# Patient Record
Sex: FEMALE | Race: WHITE | NOT HISPANIC OR LATINO | URBAN - METROPOLITAN AREA
[De-identification: names, ages, dates, MRNs, and addresses within clinical notes are randomized per-mention and may not be internally consistent; named-entity substitution may affect disease eponyms.]

---

## 2020-07-04 ENCOUNTER — EMERGENCY (EMERGENCY)
Facility: HOSPITAL | Age: 60
LOS: 0 days | Discharge: HOME | End: 2020-07-04
Attending: EMERGENCY MEDICINE
Payer: MEDICARE

## 2020-07-04 VITALS
HEART RATE: 112 BPM | TEMPERATURE: 99 F | WEIGHT: 190.04 LBS | DIASTOLIC BLOOD PRESSURE: 72 MMHG | RESPIRATION RATE: 16 BRPM | HEIGHT: 72 IN | SYSTOLIC BLOOD PRESSURE: 134 MMHG

## 2020-07-04 VITALS
DIASTOLIC BLOOD PRESSURE: 62 MMHG | HEART RATE: 85 BPM | RESPIRATION RATE: 17 BRPM | SYSTOLIC BLOOD PRESSURE: 119 MMHG | OXYGEN SATURATION: 99 %

## 2020-07-04 DIAGNOSIS — E78.5 HYPERLIPIDEMIA, UNSPECIFIED: ICD-10-CM

## 2020-07-04 DIAGNOSIS — F20.9 SCHIZOPHRENIA, UNSPECIFIED: ICD-10-CM

## 2020-07-04 DIAGNOSIS — F32.0 MAJOR DEPRESSIVE DISORDER, SINGLE EPISODE, MILD: ICD-10-CM

## 2020-07-04 DIAGNOSIS — R42 DIZZINESS AND GIDDINESS: ICD-10-CM

## 2020-07-04 LAB
ALBUMIN SERPL ELPH-MCNC: 3.4 G/DL — SIGNIFICANT CHANGE UP (ref 3.3–5)
ALP SERPL-CCNC: 126 U/L — HIGH (ref 40–120)
ALT FLD-CCNC: 11 U/L — LOW (ref 12–78)
ANION GAP SERPL CALC-SCNC: 7 MMOL/L — SIGNIFICANT CHANGE UP (ref 5–17)
APPEARANCE UR: CLEAR — SIGNIFICANT CHANGE UP
AST SERPL-CCNC: 8 U/L — LOW (ref 15–37)
BILIRUB SERPL-MCNC: 0.3 MG/DL — SIGNIFICANT CHANGE UP (ref 0.2–1.2)
BILIRUB UR-MCNC: NEGATIVE — SIGNIFICANT CHANGE UP
BUN SERPL-MCNC: 14 MG/DL — SIGNIFICANT CHANGE UP (ref 7–23)
CALCIUM SERPL-MCNC: 8.6 MG/DL — SIGNIFICANT CHANGE UP (ref 8.5–10.1)
CHLORIDE SERPL-SCNC: 111 MMOL/L — HIGH (ref 96–108)
CO2 SERPL-SCNC: 23 MMOL/L — SIGNIFICANT CHANGE UP (ref 22–31)
COLOR SPEC: YELLOW — SIGNIFICANT CHANGE UP
CREAT SERPL-MCNC: 0.82 MG/DL — SIGNIFICANT CHANGE UP (ref 0.5–1.3)
DIFF PNL FLD: NEGATIVE — SIGNIFICANT CHANGE UP
GLUCOSE SERPL-MCNC: 103 MG/DL — HIGH (ref 70–99)
GLUCOSE UR QL: NEGATIVE MG/DL — SIGNIFICANT CHANGE UP
HCT VFR BLD CALC: 39.4 % — SIGNIFICANT CHANGE UP (ref 34.5–45)
HGB BLD-MCNC: 13.1 G/DL — SIGNIFICANT CHANGE UP (ref 11.5–15.5)
KETONES UR-MCNC: NEGATIVE — SIGNIFICANT CHANGE UP
LEUKOCYTE ESTERASE UR-ACNC: NEGATIVE — SIGNIFICANT CHANGE UP
MCHC RBC-ENTMCNC: 27.4 PG — SIGNIFICANT CHANGE UP (ref 27–34)
MCHC RBC-ENTMCNC: 33.2 GM/DL — SIGNIFICANT CHANGE UP (ref 32–36)
MCV RBC AUTO: 82.4 FL — SIGNIFICANT CHANGE UP (ref 80–100)
NITRITE UR-MCNC: NEGATIVE — SIGNIFICANT CHANGE UP
NRBC # BLD: 0 /100 WBCS — SIGNIFICANT CHANGE UP (ref 0–0)
PH UR: 6.5 — SIGNIFICANT CHANGE UP (ref 5–8)
PLATELET # BLD AUTO: 395 K/UL — SIGNIFICANT CHANGE UP (ref 150–400)
POTASSIUM SERPL-MCNC: 3.8 MMOL/L — SIGNIFICANT CHANGE UP (ref 3.5–5.3)
POTASSIUM SERPL-SCNC: 3.8 MMOL/L — SIGNIFICANT CHANGE UP (ref 3.5–5.3)
PROT SERPL-MCNC: 6.6 GM/DL — SIGNIFICANT CHANGE UP (ref 6–8.3)
PROT UR-MCNC: NEGATIVE MG/DL — SIGNIFICANT CHANGE UP
RBC # BLD: 4.78 M/UL — SIGNIFICANT CHANGE UP (ref 3.8–5.2)
RBC # FLD: 16.7 % — HIGH (ref 10.3–14.5)
SODIUM SERPL-SCNC: 141 MMOL/L — SIGNIFICANT CHANGE UP (ref 135–145)
SP GR SPEC: 1.01 — SIGNIFICANT CHANGE UP (ref 1.01–1.02)
TROPONIN I SERPL-MCNC: <.015 NG/ML — SIGNIFICANT CHANGE UP (ref 0.01–0.04)
UROBILINOGEN FLD QL: NEGATIVE MG/DL — SIGNIFICANT CHANGE UP
WBC # BLD: 13.5 K/UL — HIGH (ref 3.8–10.5)
WBC # FLD AUTO: 13.5 K/UL — HIGH (ref 3.8–10.5)

## 2020-07-04 PROCEDURE — 99285 EMERGENCY DEPT VISIT HI MDM: CPT

## 2020-07-04 PROCEDURE — 93010 ELECTROCARDIOGRAM REPORT: CPT

## 2020-07-04 RX ORDER — SODIUM CHLORIDE 9 MG/ML
3 INJECTION INTRAMUSCULAR; INTRAVENOUS; SUBCUTANEOUS ONCE
Refills: 0 | Status: COMPLETED | OUTPATIENT
Start: 2020-07-04 | End: 2020-07-04

## 2020-07-04 RX ADMIN — SODIUM CHLORIDE 3 MILLILITER(S): 9 INJECTION INTRAMUSCULAR; INTRAVENOUS; SUBCUTANEOUS at 17:18

## 2020-07-04 NOTE — ED PROVIDER NOTE - OBJECTIVE STATEMENT
here with feeling 'overwhelmed' during get togther.  pt suffers from depression and schizophrenia.  recent increase in meds due to worsening depression.  pt feels ok.  no chest pain , Nausea, vomiting

## 2020-07-04 NOTE — ED ADULT NURSE NOTE - OBJECTIVE STATEMENT
Received lying on stretcher alert and oriented x4 states that she went outside  and had an episode of dizziness/vertigo  subsequently her knees buckled and fell denies any LOC , head trauma nausea vomiting

## 2020-07-04 NOTE — ED PROVIDER NOTE - PROGRESS NOTE DETAILS
endorsed by dr reeves pending bed placement for involuntary psych admission. Pt without any signs of infection, fever, symptoms. UA is neg for UTI.  WCC 13 is likely reactive. Pt with chronic rt ankle pain (s/p fx and surgery last year). Pt denies any acute pain or swelling. Pt has been ambulating easily. No erythema, swelling to rt ankle and pt has from of ankle with good pulses and without any signs of infection. Pt is medically cleared for psychiatric admission. There are no beds within the system. JUANA arranged for transfer to [place]

## 2020-07-04 NOTE — ED PROVIDER NOTE - PHYSICAL EXAMINATION
Correa:  General: No distress.  Mentation at baseline.   HEENT: WNL  Chest/Lungs: CTAB, No wheeze, No retractions, No increased work of breathing, Normal rate  Heart: S1S2 RRR, No M/R/G, Pules equal Bilaterally in upper and lower extremities distally  Abd: soft, NT/ND, No guarding, No rebound.  No hernias, no palpable masses.  Extrem: FROM in all joints, no significant edema noted, No ulcers.  Cap refil < 2sec.  Skin: No rash noted, warm dry.  Neuro:  Grossly normal.  No difficulty ambulating. No focal deficits.  Psychiatric: No evidence of delusions. No SI/HI.

## 2020-07-04 NOTE — ED ADULT TRIAGE NOTE - CHIEF COMPLAINT QUOTE
pt states she is having dizziness since one week and today had a moment of near syncope . pt also seem very anxious . history of bipolar and schizophrenia . denies any suicidal or homicidal ideation.

## 2020-07-04 NOTE — ED PROVIDER NOTE - PATIENT PORTAL LINK FT
You can access the FollowMyHealth Patient Portal offered by [facility] by registering at the following website: [email or web address]. By joining [facility]’s FollowMyHealth portal, you will also be able to view your health information using other applications (apps) compatible with our system.

## 2020-08-05 ENCOUNTER — EMERGENCY (EMERGENCY)
Facility: HOSPITAL | Age: 60
LOS: 0 days | Discharge: PSYCHIATRIC OR SUBSTANCE USE FACILITY | End: 2020-08-06
Attending: STUDENT IN AN ORGANIZED HEALTH CARE EDUCATION/TRAINING PROGRAM
Payer: MEDICARE

## 2020-08-05 VITALS
TEMPERATURE: 99 F | OXYGEN SATURATION: 96 % | SYSTOLIC BLOOD PRESSURE: 131 MMHG | RESPIRATION RATE: 20 BRPM | HEART RATE: 117 BPM | HEIGHT: 72 IN | WEIGHT: 195.11 LBS | DIASTOLIC BLOOD PRESSURE: 100 MMHG

## 2020-08-05 DIAGNOSIS — F99 MENTAL DISORDER, NOT OTHERWISE SPECIFIED: ICD-10-CM

## 2020-08-05 DIAGNOSIS — F29 UNSPECIFIED PSYCHOSIS NOT DUE TO A SUBSTANCE OR KNOWN PHYSIOLOGICAL CONDITION: ICD-10-CM

## 2020-08-05 DIAGNOSIS — Z88.0 ALLERGY STATUS TO PENICILLIN: ICD-10-CM

## 2020-08-05 DIAGNOSIS — F23 BRIEF PSYCHOTIC DISORDER: ICD-10-CM

## 2020-08-05 LAB
BASOPHILS # BLD AUTO: 0.02 K/UL — SIGNIFICANT CHANGE UP (ref 0–0.2)
BASOPHILS NFR BLD AUTO: 0.1 % — SIGNIFICANT CHANGE UP (ref 0–2)
EOSINOPHIL # BLD AUTO: 0 K/UL — SIGNIFICANT CHANGE UP (ref 0–0.5)
EOSINOPHIL NFR BLD AUTO: 0 % — SIGNIFICANT CHANGE UP (ref 0–6)
HCT VFR BLD CALC: 37.3 % — SIGNIFICANT CHANGE UP (ref 34.5–45)
HGB BLD-MCNC: 12.4 G/DL — SIGNIFICANT CHANGE UP (ref 11.5–15.5)
IMM GRANULOCYTES NFR BLD AUTO: 0.3 % — SIGNIFICANT CHANGE UP (ref 0–1.5)
LYMPHOCYTES # BLD AUTO: 17.7 % — SIGNIFICANT CHANGE UP (ref 13–44)
LYMPHOCYTES # BLD AUTO: 2.65 K/UL — SIGNIFICANT CHANGE UP (ref 1–3.3)
MCHC RBC-ENTMCNC: 27.4 PG — SIGNIFICANT CHANGE UP (ref 27–34)
MCHC RBC-ENTMCNC: 33.2 GM/DL — SIGNIFICANT CHANGE UP (ref 32–36)
MCV RBC AUTO: 82.5 FL — SIGNIFICANT CHANGE UP (ref 80–100)
MONOCYTES # BLD AUTO: 1.35 K/UL — HIGH (ref 0–0.9)
MONOCYTES NFR BLD AUTO: 9 % — SIGNIFICANT CHANGE UP (ref 2–14)
NEUTROPHILS # BLD AUTO: 10.91 K/UL — HIGH (ref 1.8–7.4)
NEUTROPHILS NFR BLD AUTO: 72.9 % — SIGNIFICANT CHANGE UP (ref 43–77)
NRBC # BLD: 0 /100 WBCS — SIGNIFICANT CHANGE UP (ref 0–0)
PLATELET # BLD AUTO: 470 K/UL — HIGH (ref 150–400)
RBC # BLD: 4.52 M/UL — SIGNIFICANT CHANGE UP (ref 3.8–5.2)
RBC # FLD: 16.3 % — HIGH (ref 10.3–14.5)
WBC # BLD: 14.97 K/UL — HIGH (ref 3.8–10.5)
WBC # FLD AUTO: 14.97 K/UL — HIGH (ref 3.8–10.5)

## 2020-08-05 PROCEDURE — 93010 ELECTROCARDIOGRAM REPORT: CPT

## 2020-08-05 PROCEDURE — 99285 EMERGENCY DEPT VISIT HI MDM: CPT | Mod: CS

## 2020-08-05 NOTE — ED ADULT TRIAGE NOTE - CHIEF COMPLAINT QUOTE
Pt states she needs a , states she needs to confess, states she called her friend the "N" word. Pt states she is hallucinating. Unable to answer if she is having audial or visual hallucination. Denies suicidal and homicidal ideation. Denies alcohol and drug use. Pt complains of right ankle pain. As per EMS pt states she is not sleeping and hearing things. Can't get a hold of her crisis counselor. Pt rambling and loud in triage.

## 2020-08-06 ENCOUNTER — INPATIENT (INPATIENT)
Facility: HOSPITAL | Age: 60
LOS: 12 days | Discharge: HOME | End: 2020-08-19
Attending: PSYCHIATRY & NEUROLOGY | Admitting: PSYCHIATRY & NEUROLOGY
Payer: MEDICARE

## 2020-08-06 VITALS
OXYGEN SATURATION: 97 % | TEMPERATURE: 98 F | DIASTOLIC BLOOD PRESSURE: 67 MMHG | HEART RATE: 71 BPM | RESPIRATION RATE: 18 BRPM | SYSTOLIC BLOOD PRESSURE: 122 MMHG

## 2020-08-06 VITALS — HEIGHT: 72 IN | WEIGHT: 194.01 LBS | OXYGEN SATURATION: 100 % | RESPIRATION RATE: 19 BRPM | TEMPERATURE: 98 F

## 2020-08-06 DIAGNOSIS — F25.0 SCHIZOAFFECTIVE DISORDER, BIPOLAR TYPE: ICD-10-CM

## 2020-08-06 LAB
ALBUMIN SERPL ELPH-MCNC: 3.5 G/DL — SIGNIFICANT CHANGE UP (ref 3.3–5)
ALP SERPL-CCNC: 137 U/L — HIGH (ref 40–120)
ALT FLD-CCNC: 19 U/L — SIGNIFICANT CHANGE UP (ref 12–78)
ANION GAP SERPL CALC-SCNC: 9 MMOL/L — SIGNIFICANT CHANGE UP (ref 5–17)
APAP SERPL-MCNC: <2 UG/ML — LOW (ref 10–30)
APPEARANCE UR: CLEAR — SIGNIFICANT CHANGE UP
AST SERPL-CCNC: 20 U/L — SIGNIFICANT CHANGE UP (ref 15–37)
BACTERIA # UR AUTO: ABNORMAL
BILIRUB SERPL-MCNC: 0.3 MG/DL — SIGNIFICANT CHANGE UP (ref 0.2–1.2)
BILIRUB UR-MCNC: NEGATIVE — SIGNIFICANT CHANGE UP
BUN SERPL-MCNC: 10 MG/DL — SIGNIFICANT CHANGE UP (ref 7–23)
CALCIUM SERPL-MCNC: 8.8 MG/DL — SIGNIFICANT CHANGE UP (ref 8.5–10.1)
CHLORIDE SERPL-SCNC: 108 MMOL/L — SIGNIFICANT CHANGE UP (ref 96–108)
CK SERPL-CCNC: 199 U/L — HIGH (ref 26–192)
CO2 SERPL-SCNC: 22 MMOL/L — SIGNIFICANT CHANGE UP (ref 22–31)
COLOR SPEC: YELLOW — SIGNIFICANT CHANGE UP
CREAT SERPL-MCNC: 0.85 MG/DL — SIGNIFICANT CHANGE UP (ref 0.5–1.3)
DIFF PNL FLD: NEGATIVE — SIGNIFICANT CHANGE UP
EPI CELLS # UR: ABNORMAL
ETHANOL SERPL-MCNC: <10 MG/DL — SIGNIFICANT CHANGE UP (ref 0–10)
GLUCOSE SERPL-MCNC: 103 MG/DL — HIGH (ref 70–99)
GLUCOSE UR QL: NEGATIVE MG/DL — SIGNIFICANT CHANGE UP
KETONES UR-MCNC: ABNORMAL
LACTATE SERPL-SCNC: 0.8 MMOL/L — SIGNIFICANT CHANGE UP (ref 0.7–2)
LEUKOCYTE ESTERASE UR-ACNC: ABNORMAL
NITRITE UR-MCNC: NEGATIVE — SIGNIFICANT CHANGE UP
PH UR: 5 — SIGNIFICANT CHANGE UP (ref 5–8)
POTASSIUM SERPL-MCNC: 3.8 MMOL/L — SIGNIFICANT CHANGE UP (ref 3.5–5.3)
POTASSIUM SERPL-SCNC: 3.8 MMOL/L — SIGNIFICANT CHANGE UP (ref 3.5–5.3)
PROT SERPL-MCNC: 7 GM/DL — SIGNIFICANT CHANGE UP (ref 6–8.3)
PROT UR-MCNC: NEGATIVE MG/DL — SIGNIFICANT CHANGE UP
RBC CASTS # UR COMP ASSIST: SIGNIFICANT CHANGE UP /HPF (ref 0–4)
SALICYLATES SERPL-MCNC: 2.6 MG/DL — LOW (ref 2.8–20)
SARS-COV-2 RNA SPEC QL NAA+PROBE: SIGNIFICANT CHANGE UP
SODIUM SERPL-SCNC: 139 MMOL/L — SIGNIFICANT CHANGE UP (ref 135–145)
SP GR SPEC: 1.02 — SIGNIFICANT CHANGE UP (ref 1.01–1.02)
TSH SERPL-MCNC: 0.76 UIU/ML — SIGNIFICANT CHANGE UP (ref 0.36–3.74)
UROBILINOGEN FLD QL: NEGATIVE MG/DL — SIGNIFICANT CHANGE UP
WBC UR QL: ABNORMAL

## 2020-08-06 PROCEDURE — 73610 X-RAY EXAM OF ANKLE: CPT | Mod: 26,RT

## 2020-08-06 PROCEDURE — 90792 PSYCH DIAG EVAL W/MED SRVCS: CPT | Mod: 95

## 2020-08-06 PROCEDURE — 71045 X-RAY EXAM CHEST 1 VIEW: CPT | Mod: 26

## 2020-08-06 RX ORDER — MIDAZOLAM HYDROCHLORIDE 1 MG/ML
5 INJECTION, SOLUTION INTRAMUSCULAR; INTRAVENOUS ONCE
Refills: 0 | Status: DISCONTINUED | OUTPATIENT
Start: 2020-08-06 | End: 2020-08-06

## 2020-08-06 RX ORDER — NICOTINE POLACRILEX 2 MG
2 GUM BUCCAL
Refills: 0 | Status: DISCONTINUED | OUTPATIENT
Start: 2020-08-06 | End: 2020-08-19

## 2020-08-06 RX ORDER — CLOZAPINE 150 MG/1
300 TABLET, ORALLY DISINTEGRATING ORAL AT BEDTIME
Refills: 0 | Status: DISCONTINUED | OUTPATIENT
Start: 2020-08-06 | End: 2020-08-06

## 2020-08-06 RX ORDER — OLANZAPINE 15 MG/1
10 TABLET, FILM COATED ORAL ONCE
Refills: 0 | Status: COMPLETED | OUTPATIENT
Start: 2020-08-06 | End: 2020-08-06

## 2020-08-06 RX ORDER — CLOZAPINE 150 MG/1
325 TABLET, ORALLY DISINTEGRATING ORAL AT BEDTIME
Refills: 0 | Status: DISCONTINUED | OUTPATIENT
Start: 2020-08-06 | End: 2020-08-19

## 2020-08-06 RX ORDER — SIMVASTATIN 20 MG/1
20 TABLET, FILM COATED ORAL AT BEDTIME
Refills: 0 | Status: DISCONTINUED | OUTPATIENT
Start: 2020-08-06 | End: 2020-08-19

## 2020-08-06 RX ORDER — TRAZODONE HCL 50 MG
50 TABLET ORAL AT BEDTIME
Refills: 0 | Status: DISCONTINUED | OUTPATIENT
Start: 2020-08-06 | End: 2020-08-06

## 2020-08-06 RX ORDER — ACETAMINOPHEN 500 MG
650 TABLET ORAL ONCE
Refills: 0 | Status: COMPLETED | OUTPATIENT
Start: 2020-08-06 | End: 2020-08-06

## 2020-08-06 RX ORDER — DIPHENHYDRAMINE HCL 50 MG
50 CAPSULE ORAL ONCE
Refills: 0 | Status: COMPLETED | OUTPATIENT
Start: 2020-08-06 | End: 2020-08-06

## 2020-08-06 RX ORDER — ZIPRASIDONE HYDROCHLORIDE 20 MG/1
20 CAPSULE ORAL ONCE
Refills: 0 | Status: COMPLETED | OUTPATIENT
Start: 2020-08-06 | End: 2020-08-06

## 2020-08-06 RX ORDER — LANOLIN ALCOHOL/MO/W.PET/CERES
3 CREAM (GRAM) TOPICAL AT BEDTIME
Refills: 0 | Status: DISCONTINUED | OUTPATIENT
Start: 2020-08-06 | End: 2020-08-19

## 2020-08-06 RX ORDER — HALOPERIDOL DECANOATE 100 MG/ML
5 INJECTION INTRAMUSCULAR EVERY 4 HOURS
Refills: 0 | Status: DISCONTINUED | OUTPATIENT
Start: 2020-08-06 | End: 2020-08-19

## 2020-08-06 RX ORDER — MIDAZOLAM HYDROCHLORIDE 1 MG/ML
4 INJECTION, SOLUTION INTRAMUSCULAR; INTRAVENOUS ONCE
Refills: 0 | Status: DISCONTINUED | OUTPATIENT
Start: 2020-08-06 | End: 2020-08-06

## 2020-08-06 RX ORDER — HALOPERIDOL DECANOATE 100 MG/ML
5 INJECTION INTRAMUSCULAR ONCE
Refills: 0 | Status: DISCONTINUED | OUTPATIENT
Start: 2020-08-06 | End: 2020-08-19

## 2020-08-06 RX ORDER — ZIPRASIDONE HYDROCHLORIDE 20 MG/1
20 CAPSULE ORAL
Refills: 0 | Status: DISCONTINUED | OUTPATIENT
Start: 2020-08-06 | End: 2020-08-07

## 2020-08-06 RX ORDER — CLONAZEPAM 1 MG
1 TABLET ORAL AT BEDTIME
Refills: 0 | Status: DISCONTINUED | OUTPATIENT
Start: 2020-08-06 | End: 2020-08-07

## 2020-08-06 RX ADMIN — Medication 2 MILLIGRAM(S): at 21:28

## 2020-08-06 RX ADMIN — Medication 2 MILLIGRAM(S): at 21:39

## 2020-08-06 RX ADMIN — Medication 650 MILLIGRAM(S): at 16:05

## 2020-08-06 RX ADMIN — Medication 1 MILLIGRAM(S): at 22:32

## 2020-08-06 RX ADMIN — OLANZAPINE 10 MILLIGRAM(S): 15 TABLET, FILM COATED ORAL at 00:45

## 2020-08-06 RX ADMIN — SIMVASTATIN 20 MILLIGRAM(S): 20 TABLET, FILM COATED ORAL at 21:28

## 2020-08-06 RX ADMIN — ZIPRASIDONE HYDROCHLORIDE 20 MILLIGRAM(S): 20 CAPSULE ORAL at 21:28

## 2020-08-06 RX ADMIN — Medication 2 MILLIGRAM(S): at 00:46

## 2020-08-06 RX ADMIN — Medication 2 MILLIGRAM(S): at 03:51

## 2020-08-06 RX ADMIN — OLANZAPINE 10 MILLIGRAM(S): 15 TABLET, FILM COATED ORAL at 01:34

## 2020-08-06 RX ADMIN — MIDAZOLAM HYDROCHLORIDE 4 MILLIGRAM(S): 1 INJECTION, SOLUTION INTRAMUSCULAR; INTRAVENOUS at 05:10

## 2020-08-06 RX ADMIN — HALOPERIDOL DECANOATE 5 MILLIGRAM(S): 100 INJECTION INTRAMUSCULAR at 21:28

## 2020-08-06 RX ADMIN — Medication 50 MILLIGRAM(S): at 05:09

## 2020-08-06 RX ADMIN — ZIPRASIDONE HYDROCHLORIDE 20 MILLIGRAM(S): 20 CAPSULE ORAL at 10:23

## 2020-08-06 RX ADMIN — CLOZAPINE 325 MILLIGRAM(S): 150 TABLET, ORALLY DISINTEGRATING ORAL at 21:28

## 2020-08-06 RX ADMIN — OLANZAPINE 10 MILLIGRAM(S): 15 TABLET, FILM COATED ORAL at 12:41

## 2020-08-06 NOTE — ED PROVIDER NOTE - PHYSICAL EXAMINATION
VITAL SIGNS: I have reviewed nursing notes and confirm.   GEN: Well-developed; well-nourished; in no acute distress. Speaking full sentences.  SKIN: Warm, pink, no rash, no diaphoresis, no cyanosis, well perfused.   HEAD: Normocephalic; atraumatic.   NECK: Supple; non tender.   EYES: Pupils 3mm equal, round, reactive to light and accomodation, conjunctiva and sclera clear. EOMI  ENT: No nasal discharge; airway clear.   CV: S1, S2 normal; no murmurs, gallops, or rubs. RRR. No lower extremity pitting edema bilaterally. Capillary refill < 2 seconds throughout. Distal pulses intact 2+ throughout.  RESP: Clear to auscultation bilaterally. No wheezes, rales or rhonchi.   ABD: Normal bowel sounds, soft, non-distended, non-tender, no hepatosplenomegaly. No CVA tenderness bilaterally.  EXT: Normal range of motion and movement of all 4 extremities. No joint or muscular pain throughout. No clubbing. (+) RIGHT ANKLE: chronic surgical scars, good 5/5 motor strength in right ankle.   NEURO: Alert & oriented x 3, Grossly unremarkable. Sensory and motor intact throughout. No focal deficits. Gait: Fluid. Normal speech and coordination.   PSYCH: (+) disorganized speech, flight of ideas, delusional, visual hallucinations.

## 2020-08-06 NOTE — ED PROVIDER NOTE - CARE PLAN
Principal Discharge DX:	Schizophrenia, acute  Secondary Diagnosis:	Psychosis, unspecified psychosis type

## 2020-08-06 NOTE — ED BEHAVIORAL HEALTH ASSESSMENT NOTE - RISK ASSESSMENT
Risks: current sx of nova, psychosis/mood d/o hx, insomnia with poor response to medications, recent psychiatric admission with med changes, prior serious suicide attempts, prior high risk behaviors when manic, poor insight. Protective factors; help seeking/accepting. Low Acute Suicide Risk

## 2020-08-06 NOTE — ED BEHAVIORAL HEALTH ASSESSMENT NOTE - OTHER
Scattered Unable to assess Decompensated psychosis - pt historically high risk of harm to self when manic/psychotic Roommates [facility], [facility] ED Visits, [facility] Outpatient [facility], [facility], [facility], [facility], [facility] , [facility], [facility], [facility], google search, [facility] ED, [facility] Out[facility] Medical, [facility]s, , [facility] Inpatient Psychiatry, [facility]patient Psychiatry, [facility], [facility] ED, Supported Housing Recent admission with changes in medications Help seeking deferred not observed hyperverbal pressured insomnia, med focused, help seeking, otherwise largely random - sings intermittently during assessment Notably disinhibited verbally
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
## 2020-08-06 NOTE — ED PROVIDER NOTE - PROGRESS NOTE DETAILS
s/p 2mg PO lorazepam + 10 mg PO olanzapine with no effect, s/p 10 mg IM olanzapine with no effect. Psych requests involuntary psych admit. pending covid testing, then dispo. s/p additional 4mg IM lorazepam, 5mg IV versed, 50mg benadryl IV. Patient asleep, however after 1 hour, woke up with pleasant manic behavior. pending disposition from psych. pt signed out to me from dr villegas, pt presented psychotic, was evaluated and a psych admission, covid testing pending at this time Dr Blevins from telepsych called, he did evaluate the patient, pt was given her morning geodon 20mg today, pt is currently not aggressive, singing at times, occasionally ambulates to the bathroom with assistance, pt is currently on a 1:1, as per dr blevins, pt is prescribed clozapin 300mg at bedtime (REM's ID: [id number]) pt given Tylenol for mild ankle pain TP called and informed of negative covid test, they will begin a bed search and inform us when a bed is found pt accepted to [facility], floor 2w to DR Barahona

## 2020-08-06 NOTE — ED PROVIDER NOTE - OBJECTIVE STATEMENT
59F PMHX of depression, schizophrenia (clozapine, amitriptyline, Ziprasidone), prior SI, presenting with "im suicidal". Patient was well controlled on clozapine, but missed her dose today. Reports being up for the past 24 hours, with suicidal thoughts, but no plan. Associated w/ visual hallucinations, flight of ideas, disorganized speech, delusions. Denies any chest pain, headaches, abdominal pain, sob, n/v, neck pain, trauma, falls, self-harm, overdose, dysuria/hematuria, diarrhea/constipation, fevers/chills.

## 2020-08-06 NOTE — ED BEHAVIORAL HEALTH NOTE - BEHAVIORAL HEALTH NOTE
===================  PRE-HOSPITAL COURSE  ===================  SOURCE: Triage documentation.   DETAILS:  Patient was BIBEMS; chief complaint of psychosis of unknown origin.     ============  ED COURSE   ============  SOURCE: RN and triage documentation.    ARRIVAL:    BELONGINGS:  None notable; items stowed with security.   BEHAVIOR:   TREATMENT:    VISITORS:     [facility] attempted to contact collateral Simone Shadi [phone]; busy tone, unable to leave messages. ===================  PRE-HOSPITAL COURSE  ===================  SOURCE: Triage documentation.   DETAILS:  Patient was BIBEMS; chief complaint of psychosis of unknown origin, VH.     ============  ED COURSE   ============  SOURCE: ED Chart information.   ARRIVAL:  Patient was loud and rambling while in triage; unable to participate in assessment questions.   BELONGINGS:  None notable; items stowed with security.   BEHAVIOR: Patient has been behaving bizarrely while in ED; tangential speech, singing, making sexual comments to EMT's as per ED chart. Blood and urine were provided for routine labs as was COVID swab.   TREATMENT: Patient has received Ativan 2mg IM, Ativan 2mg PO, Zyprexa 10mg PO, and Zyprexa 10mg IM, tolerated well.   VISITORS: No noted visitors at bedside.     [facility] attempted to contact collateral Simone Hsu [phone]; busy tone, unable to leave messages.

## 2020-08-06 NOTE — ED ADULT NURSE NOTE - HPI (INCLUDE ILLNESS QUALITY, SEVERITY, DURATION, TIMING, CONTEXT, MODIFYING FACTORS, ASSOCIATED SIGNS AND SYMPTOMS)
pt states she called EMS because she was having visual hallucinations. Pt has flight of ideas states " I see people around me in a Sikh " Pt also states I called my friend ney Funk**, do you think she will forgive me" Pt continue to state " I need to talk to God" , Pt asked writer " are you a  ? " PT is observed making sexual comments to EMS in triage. PT also singing in ED. PT denies AH . Pt admits to SI last week , states " I want to hurt myself all the time " . States she is compliant with psych meds. Pt denies plan/ denies ETOH and Substance abuse . RUE tremors noted. Lastly PT C/O R ankle pain noted ambulates with walker. pt states she called EMS because she was having visual hallucinations. Pt has flight of ideas states " I see people around me in a Yazidism " Pt also states I called my friend ney Funk**, do you think she will forgive me" Pt continue to state " I need to talk to God" , Pt asked writer " are you a  ? " PT is observed making sexual comments to EMS in triage. PT also singing in ED. PT denies AH . Pt admits to SI last week , states " I want to hurt myself all the time " .  Pt states " where is doctor " then calls him a racists name. Pt states " I had a baby it is a boy" States she is compliant with psych meds. Pt denies plan/ denies ETOH and Substance abuse . RUE tremors noted. Lastly PT C/O R ankle pain noted ambulates with walker.
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
## 2020-08-06 NOTE — ED BEHAVIORAL HEALTH ASSESSMENT NOTE - DETAILS
Pending COVID test and bedsearch [facility] as per HPI MDD (father) scoliosis (mother) constipation right ankle pain and swelling pt self activated EMS (reportedly) Provider unknown as noted below

## 2020-08-06 NOTE — ED ADULT NURSE NOTE - OBJECTIVE STATEMENT
pt states she called EMS because she was having visual hallucinations. Pt has flight of ideas states " I see people around me in a Buddhism " Pt also states I called my friend ney Funk**, do you think she will forgive me" Pt continue to state " I need to talk to God" , Pt asked writer " are you a  ? " PT is observed making sexual comments to EMS in triage. PT also singing in ED. PT denies AH . Pt admits to SI last week , states " I want to hurt myself all the time " . States she is compliant with psych meds. Pt denies plan/ denies ETOH and Substance abuse . RUE tremors noted. Lastly PT C/O R ankle pain noted ambulates with walker. pt states she called EMS because she was having visual hallucinations. Pt has flight of ideas states " I see people around me in a Denominational " Pt also states I called my friend ney Funk**, do you think she will forgive me" Pt continue to state " I need to talk to God" , Pt asked writer " are you a  ? " PT is observed making sexual comments to EMS in triage. PT also singing in ED. PT denies AH . Pt admits to SI last week , states " I want to hurt myself all the time " .  Pt states " where is doctor " then calls him a racists name. Pt states " I had a baby it is a boy" States she is compliant with psych meds. Pt denies plan/ denies ETOH and Substance abuse . RUE tremors noted. Lastly PT C/O R ankle pain noted ambulates with walker.

## 2020-08-06 NOTE — ED BEHAVIORAL HEALTH ASSESSMENT NOTE - DESCRIPTION
As per [facility] note:   ===================  PRE-HOSPITAL COURSE  ===================  SOURCE: Triage documentation.   DETAILS:  Patient was BIBEMS; chief complaint of psychosis of unknown origin, VH.     ============  ED COURSE   ============  SOURCE: ED Chart information.   ARRIVAL:  Patient was loud and rambling while in triage; unable to participate in assessment questions.   BELONGINGS:  None notable; items stowed with security.   BEHAVIOR: Patient has been behaving bizarrely while in ED; tangential speech, singing, making sexual comments to EMT's as per ED chart. Blood and urine were provided for routine labs as was COVID swab.   TREATMENT: Patient has received Ativan 2mg IM, Ativan 2mg PO, Zyprexa 10mg PO, and Zyprexa 10mg IM, tolerated well.   VISITORS: No noted visitors at bedside.     [facility] attempted to contact collateral Simone Hsu [phone]; busy tone, unable to leave messages. as per HPI

## 2020-08-06 NOTE — ED PROVIDER NOTE - CLINICAL SUMMARY MEDICAL DECISION MAKING FREE TEXT BOX
psychotic, schizophrenia  - psych labs, IM olanzapine/IM ativan for agitation control  - consult psych  - dispo accordingly.
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
## 2020-08-07 PROCEDURE — 99223 1ST HOSP IP/OBS HIGH 75: CPT

## 2020-08-07 PROCEDURE — 99232 SBSQ HOSP IP/OBS MODERATE 35: CPT

## 2020-08-07 RX ORDER — LITHIUM CARBONATE 300 MG/1
300 TABLET, EXTENDED RELEASE ORAL
Refills: 0 | Status: DISCONTINUED | OUTPATIENT
Start: 2020-08-07 | End: 2020-08-11

## 2020-08-07 RX ORDER — ACETAMINOPHEN 500 MG
650 TABLET ORAL ONCE
Refills: 0 | Status: COMPLETED | OUTPATIENT
Start: 2020-08-07 | End: 2020-08-07

## 2020-08-07 RX ORDER — NICOTINE POLACRILEX 2 MG
1 GUM BUCCAL DAILY
Refills: 0 | Status: DISCONTINUED | OUTPATIENT
Start: 2020-08-07 | End: 2020-08-19

## 2020-08-07 RX ORDER — ACETAMINOPHEN 500 MG
650 TABLET ORAL ONCE
Refills: 0 | Status: COMPLETED | OUTPATIENT
Start: 2020-08-07 | End: 2020-08-08

## 2020-08-07 RX ORDER — POLYETHYLENE GLYCOL 3350 17 G/17G
17 POWDER, FOR SOLUTION ORAL DAILY
Refills: 0 | Status: DISCONTINUED | OUTPATIENT
Start: 2020-08-07 | End: 2020-08-19

## 2020-08-07 RX ORDER — SENNA PLUS 8.6 MG/1
2 TABLET ORAL AT BEDTIME
Refills: 0 | Status: DISCONTINUED | OUTPATIENT
Start: 2020-08-07 | End: 2020-08-19

## 2020-08-07 RX ORDER — CLONAZEPAM 1 MG
1 TABLET ORAL
Refills: 0 | Status: DISCONTINUED | OUTPATIENT
Start: 2020-08-07 | End: 2020-08-10

## 2020-08-07 RX ADMIN — SENNA PLUS 2 TABLET(S): 8.6 TABLET ORAL at 20:55

## 2020-08-07 RX ADMIN — SIMVASTATIN 20 MILLIGRAM(S): 20 TABLET, FILM COATED ORAL at 20:55

## 2020-08-07 RX ADMIN — HALOPERIDOL DECANOATE 5 MILLIGRAM(S): 100 INJECTION INTRAMUSCULAR at 15:30

## 2020-08-07 RX ADMIN — LITHIUM CARBONATE 300 MILLIGRAM(S): 300 TABLET, EXTENDED RELEASE ORAL at 20:55

## 2020-08-07 RX ADMIN — Medication 650 MILLIGRAM(S): at 21:38

## 2020-08-07 RX ADMIN — Medication 1 MILLIGRAM(S): at 20:55

## 2020-08-07 RX ADMIN — Medication 650 MILLIGRAM(S): at 20:55

## 2020-08-07 RX ADMIN — Medication 2 MILLIGRAM(S): at 15:30

## 2020-08-07 RX ADMIN — ZIPRASIDONE HYDROCHLORIDE 20 MILLIGRAM(S): 20 CAPSULE ORAL at 09:18

## 2020-08-07 RX ADMIN — Medication 1 PATCH: at 15:27

## 2020-08-07 RX ADMIN — POLYETHYLENE GLYCOL 3350 17 GRAM(S): 17 POWDER, FOR SOLUTION ORAL at 15:30

## 2020-08-07 RX ADMIN — CLOZAPINE 325 MILLIGRAM(S): 150 TABLET, ORALLY DISINTEGRATING ORAL at 20:55

## 2020-08-07 NOTE — CONSULT NOTE ADULT - ASSESSMENT
59F admitted for management of psychosis, with postsurgical edema of right ankle    Plan:  Ankle swelling: Non tender np palpable coord, low suspicion for DVT.  Likely disruption of venous return due to surgery and/or post-injury arthritis.  Oce pack or cold pack prn swelling, elevate leg, tylenol or ibuprofen prn pain.    HLD: Contineu statin    Psychosis: Management per primary team

## 2020-08-07 NOTE — CONSULT NOTE ADULT - SUBJECTIVE AND OBJECTIVE BOX
HPI: Pt is 59F admitted to [facility] 20 for management of psychosis.  She notes right ankle swelling and had some pain on admission, but none now.  She reports that in 2019 she broke her right ankle (she is unsure how) and had surgery on it in the [place].  Since then it intermittently swells up.  She is able to ambulate on the ankle and denies pain at present.      PAST MEDICAL & SURGICAL HISTORY:  Schizophrenia  Hyperlipemia  ?right ankle surgery       Review of Systems:   CONSTITUTIONAL: No fever, weight loss, or fatigue  EYES: No eye pain, visual disturbances, or discharge  ENMT:  No difficulty hearing, tinnitus, vertigo; No sinus or throat pain  NECK: No pain or stiffness  RESPIRATORY: No cough, wheezing, chills or hemoptysis; No shortness of breath  CARDIOVASCULAR: No chest pain, palpitations, dizziness, or leg swelling  GASTROINTESTINAL: No abdominal or epigastric pain. No nausea, vomiting, or hematemesis; No diarrhea or constipation. No melena or hematochezia.  GENITOURINARY: No dysuria, frequency, hematuria, or incontinence  NEUROLOGICAL: No headaches, memory loss, loss of strength, numbness, or tremors  SKIN: No itching, burning, rashes, or lesions   LYMPH NODES: No enlarged glands  ENDOCRINE: No heat or cold intolerance; No hair loss  MUSCULOSKELETAL: see HPI  HEME/LYMPH: No easy bruising, or bleeding gums  ALLERY AND IMMUNOLOGIC: No hives or eczema    Allergies    penicillin (Swelling)    Intolerances        Social History: + tob denies etoh ifu    FAMILY HISTORY: noncontributory      MEDICATIONS  (STANDING):  clonazePAM  Tablet 1 milliGRAM(s) Oral at bedtime  cloZAPine 325 milliGRAM(s) Oral at bedtime  polyethylene glycol 3350 17 Gram(s) Oral daily  senna 2 Tablet(s) Oral at bedtime  simvastatin 20 milliGRAM(s) Oral at bedtime  ziprasidone 20 milliGRAM(s) Oral two times a day    MEDICATIONS  (PRN):  haloperidol     Tablet 5 milliGRAM(s) Oral every 4 hours PRN Agitation  haloperidol    Injectable 5 milliGRAM(s) IntraMuscular once PRN Severe agitation  LORazepam     Tablet 2 milliGRAM(s) Oral every 4 hours PRN Anxiety/agitation  LORazepam   Injectable 2 milliGRAM(s) IntraMuscular once PRN Severe agitation  melatonin. 3 milliGRAM(s) Oral at bedtime PRN Insomnia  nicotine  Polacrilex Gum 2 milliGRAM(s) Oral every 3 hours PRN nicotine cravings  nicotine -   7 mG/24Hr(s) Patch 1 patch Transdermal daily PRN nicotine replacement      Vital Signs Last 24 Hrs  T(C): 36.5 (06 Aug 2020 21:15), Max: 36.6 (06 Aug 2020 15:00)  T(F): 97.7 (06 Aug 2020 21:15), Max: 97.8 (06 Aug 2020 15:00)  HR: 71 (06 Aug 2020 15:00) (71 - 71)  BP: 122/67 (06 Aug 2020 15:00) (122/67 - 122/67)  BP(mean): --  RR: 19 (06 Aug 2020 18:09) (18 - 19)  SpO2: 100% (06 Aug 2020 18:09) (97% - 100%)  CAPILLARY BLOOD GLUCOSE            PHYSICAL EXAM:  GENERAL: NAD, well-developed  HEAD:  Atraumatic, Normocephalic  EYES: EOMI, conjunctiva and sclera clear  NECK: Supple, No JVD  CHEST/LUNG: Clear to auscultation bilaterally; No wheeze  HEART: Regular rate and rhythm; No murmurs, rubs, or gallops  ABDOMEN: Soft, Nontender, Nondistended; Bowel sounds present  EXTREMITIES:  Right ankle 1+ edema nontender to calf  NEUROLOGY: non-focal  SKIN: No rashes or lesions    LABS:                        12.4   14.97 )-----------( 470      ( 05 Aug 2020 23:48 )             37.3     08-05    139  |  108  |  10  ----------------------------<  103<H>  3.8   |  22  |  0.85    Ca    8.8      05 Aug 2020 23:48    TPro  7.0  /  Alb  3.5  /  TBili  0.3  /  DBili  x   /  AST  20  /  ALT  19  /  AlkPhos  137<H>  08-05      CARDIAC MARKERS ( 05 Aug 2020 23:48 )  x     / x     / 199 U/L / x     / x          Urinalysis Basic - ( 06 Aug 2020 00:26 )    Color: Yellow / Appearance: Clear / S.020 / pH: x  Gluc: x / Ketone: Trace  / Bili: Negative / Urobili: Negative mg/dL   Blood: x / Protein: Negative mg/dL / Nitrite: Negative   Leuk Esterase: Trace / RBC: 0-2 /HPF / WBC 11-25   Sq Epi: x / Non Sq Epi: Many / Bacteria: Moderate        Care Discussed with Consultants/Other Providers: Dr Reeves

## 2020-08-08 PROCEDURE — 99231 SBSQ HOSP IP/OBS SF/LOW 25: CPT

## 2020-08-08 RX ORDER — ACETAMINOPHEN 500 MG
650 TABLET ORAL ONCE
Refills: 0 | Status: COMPLETED | OUTPATIENT
Start: 2020-08-08 | End: 2020-08-08

## 2020-08-08 RX ADMIN — Medication 1 PATCH: at 10:00

## 2020-08-08 RX ADMIN — Medication 650 MILLIGRAM(S): at 03:59

## 2020-08-08 RX ADMIN — SIMVASTATIN 20 MILLIGRAM(S): 20 TABLET, FILM COATED ORAL at 21:10

## 2020-08-08 RX ADMIN — SENNA PLUS 2 TABLET(S): 8.6 TABLET ORAL at 21:10

## 2020-08-08 RX ADMIN — POLYETHYLENE GLYCOL 3350 17 GRAM(S): 17 POWDER, FOR SOLUTION ORAL at 08:18

## 2020-08-08 RX ADMIN — Medication 650 MILLIGRAM(S): at 05:12

## 2020-08-08 RX ADMIN — Medication 650 MILLIGRAM(S): at 22:10

## 2020-08-08 RX ADMIN — Medication 1 MILLIGRAM(S): at 08:14

## 2020-08-08 RX ADMIN — CLOZAPINE 325 MILLIGRAM(S): 150 TABLET, ORALLY DISINTEGRATING ORAL at 21:10

## 2020-08-08 RX ADMIN — Medication 1 MILLIGRAM(S): at 21:10

## 2020-08-08 RX ADMIN — LITHIUM CARBONATE 300 MILLIGRAM(S): 300 TABLET, EXTENDED RELEASE ORAL at 08:15

## 2020-08-08 RX ADMIN — LITHIUM CARBONATE 300 MILLIGRAM(S): 300 TABLET, EXTENDED RELEASE ORAL at 21:10

## 2020-08-08 RX ADMIN — Medication 650 MILLIGRAM(S): at 22:40

## 2020-08-09 PROCEDURE — 99232 SBSQ HOSP IP/OBS MODERATE 35: CPT | Mod: GC

## 2020-08-09 RX ADMIN — CLOZAPINE 325 MILLIGRAM(S): 150 TABLET, ORALLY DISINTEGRATING ORAL at 21:24

## 2020-08-09 RX ADMIN — Medication 1 MILLIGRAM(S): at 08:03

## 2020-08-09 RX ADMIN — Medication 2 MILLIGRAM(S): at 18:04

## 2020-08-09 RX ADMIN — Medication 2 MILLIGRAM(S): at 02:50

## 2020-08-09 RX ADMIN — LITHIUM CARBONATE 300 MILLIGRAM(S): 300 TABLET, EXTENDED RELEASE ORAL at 21:24

## 2020-08-09 RX ADMIN — LITHIUM CARBONATE 300 MILLIGRAM(S): 300 TABLET, EXTENDED RELEASE ORAL at 08:07

## 2020-08-09 RX ADMIN — SIMVASTATIN 20 MILLIGRAM(S): 20 TABLET, FILM COATED ORAL at 21:24

## 2020-08-09 RX ADMIN — POLYETHYLENE GLYCOL 3350 17 GRAM(S): 17 POWDER, FOR SOLUTION ORAL at 08:07

## 2020-08-09 RX ADMIN — SENNA PLUS 2 TABLET(S): 8.6 TABLET ORAL at 21:24

## 2020-08-09 RX ADMIN — Medication 2 MILLIGRAM(S): at 21:32

## 2020-08-09 RX ADMIN — Medication 3 MILLIGRAM(S): at 03:38

## 2020-08-09 RX ADMIN — Medication 10 MILLIGRAM(S): at 21:24

## 2020-08-09 RX ADMIN — Medication 1 PATCH: at 08:07

## 2020-08-09 RX ADMIN — Medication 1 MILLIGRAM(S): at 21:24

## 2020-08-09 RX ADMIN — Medication 2 MILLIGRAM(S): at 15:14

## 2020-08-10 PROCEDURE — 99232 SBSQ HOSP IP/OBS MODERATE 35: CPT

## 2020-08-10 RX ORDER — ACETAMINOPHEN 500 MG
650 TABLET ORAL EVERY 6 HOURS
Refills: 0 | Status: DISCONTINUED | OUTPATIENT
Start: 2020-08-10 | End: 2020-08-19

## 2020-08-10 RX ORDER — CLONAZEPAM 1 MG
0.5 TABLET ORAL
Refills: 0 | Status: DISCONTINUED | OUTPATIENT
Start: 2020-08-10 | End: 2020-08-17

## 2020-08-10 RX ORDER — TRAZODONE HCL 50 MG
25 TABLET ORAL ONCE
Refills: 0 | Status: COMPLETED | OUTPATIENT
Start: 2020-08-10 | End: 2020-08-10

## 2020-08-10 RX ADMIN — Medication 650 MILLIGRAM(S): at 13:46

## 2020-08-10 RX ADMIN — Medication 0.5 MILLIGRAM(S): at 20:54

## 2020-08-10 RX ADMIN — Medication 650 MILLIGRAM(S): at 23:27

## 2020-08-10 RX ADMIN — Medication 650 MILLIGRAM(S): at 04:08

## 2020-08-10 RX ADMIN — Medication 2 MILLIGRAM(S): at 01:46

## 2020-08-10 RX ADMIN — SENNA PLUS 2 TABLET(S): 8.6 TABLET ORAL at 20:55

## 2020-08-10 RX ADMIN — Medication 25 MILLIGRAM(S): at 22:53

## 2020-08-10 RX ADMIN — LITHIUM CARBONATE 300 MILLIGRAM(S): 300 TABLET, EXTENDED RELEASE ORAL at 08:45

## 2020-08-10 RX ADMIN — LITHIUM CARBONATE 300 MILLIGRAM(S): 300 TABLET, EXTENDED RELEASE ORAL at 20:55

## 2020-08-10 RX ADMIN — Medication 1 PATCH: at 08:57

## 2020-08-10 RX ADMIN — Medication 2 MILLIGRAM(S): at 17:51

## 2020-08-10 RX ADMIN — Medication 1 MILLIGRAM(S): at 08:02

## 2020-08-10 RX ADMIN — Medication 650 MILLIGRAM(S): at 14:15

## 2020-08-10 RX ADMIN — Medication 2 MILLIGRAM(S): at 14:05

## 2020-08-10 RX ADMIN — Medication 2 MILLIGRAM(S): at 07:55

## 2020-08-10 RX ADMIN — CLOZAPINE 325 MILLIGRAM(S): 150 TABLET, ORALLY DISINTEGRATING ORAL at 20:55

## 2020-08-10 RX ADMIN — SIMVASTATIN 20 MILLIGRAM(S): 20 TABLET, FILM COATED ORAL at 20:55

## 2020-08-10 NOTE — PROGRESS NOTE ADULT - ASSESSMENT
59F admitted for management of psychosis, with postsurgical edema of right ankle, s/p mechanical fall    Plan:  Fall: No significant injury, no imaging needed.  tylenol prn pain.     Ankle swelling: Non tender np palpable coord, low suspicion for DVT.  Likely disruption of venous return due to surgery and/or post-injury arthritis.  Oce pack or cold pack prn swelling, elevate leg, tylenol  prn pain.    HLD: Continue statin    Psychosis: Management per primary team

## 2020-08-10 NOTE — PROGRESS NOTE ADULT - SUBJECTIVE AND OBJECTIVE BOX
CC/Reason for Consult: fall    SUBJECTIVE / OVERNIGHT EVENTS: Patient fell off side of bed last night after going to bathroom.  Reports that she sait down and started to lie down then decided to get up again for some reason and slid off the side of the bed, landing on her "coccyx".  No head strike. She says her entire left cecilia eis sorefrom outer hip to knee.    MEDICATIONS  (STANDING):  clonazePAM  Tablet 0.5 milliGRAM(s) Oral two times a day  cloZAPine 325 milliGRAM(s) Oral at bedtime  lithium SR (LITHOBID) 300 milliGRAM(s) Oral two times a day  polyethylene glycol 3350 17 Gram(s) Oral daily  senna 2 Tablet(s) Oral at bedtime  simvastatin 20 milliGRAM(s) Oral at bedtime    MEDICATIONS  (PRN):  acetaminophen   Tablet .. 650 milliGRAM(s) Oral every 6 hours PRN Mild Pain (1 - 3), Moderate Pain (4 - 6), Severe Pain (7 - 10)  haloperidol     Tablet 5 milliGRAM(s) Oral every 4 hours PRN Agitation  haloperidol    Injectable 5 milliGRAM(s) IntraMuscular once PRN Severe agitation  LORazepam     Tablet 2 milliGRAM(s) Oral every 4 hours PRN Anxiety/agitation  LORazepam   Injectable 2 milliGRAM(s) IntraMuscular once PRN Severe agitation  melatonin. 3 milliGRAM(s) Oral at bedtime PRN Insomnia  nicotine  Polacrilex Gum 2 milliGRAM(s) Oral every 3 hours PRN nicotine cravings  nicotine -   7 mG/24Hr(s) Patch 1 patch Transdermal daily PRN nicotine replacement      Vital Signs Last 24 Hrs  T(C): 36.7 (10 Aug 2020 08:25), Max: 36.8 (09 Aug 2020 18:20)  T(F): 98.1 (10 Aug 2020 08:25), Max: 98.3 (09 Aug 2020 18:20)  HR: 103 (10 Aug 2020 03:05) (103 - 103)  BP: 103/70 (10 Aug 2020 03:05) (103/70 - 103/70)  BP(mean): --  RR: 17 (10 Aug 2020 08:25) (17 - 20)  SpO2: 100% (10 Aug 2020 03:30) (100% - 100%)  CAPILLARY BLOOD GLUCOSE            PHYSICAL EXAM:  GENERAL: NAD, well-developed  HEAD:  Atraumatic, Normocephalic  EYES: EOMI, conjunctiva and sclera clear  NECK: Supple, No JVD  CHEST/LUNG: Clear to auscultation bilaterally; No wheeze  HEART: Regular rate and rhythm; No murmurs, rubs, or gallops  ABDOMEN: Soft, Nontender, Nondistended; Bowel sounds present  EXTREMITIES:  2+ Peripheral Pulses, No clubbing, cyanosis, 1+ edema right ankle edema  Right leg: full ROM without pain hip and knee joints  PSYCH: AAOx3  NEUROLOGY: non-focal  SKIN: No rashes or lesions  Care Discussed with Consultants/Other Providers: Dr Reeves

## 2020-08-11 LAB — LITHIUM SERPL-MCNC: 0.41 MMOL/L — LOW (ref 0.6–1.2)

## 2020-08-11 PROCEDURE — 99232 SBSQ HOSP IP/OBS MODERATE 35: CPT

## 2020-08-11 RX ORDER — TRAZODONE HCL 50 MG
50 TABLET ORAL AT BEDTIME
Refills: 0 | Status: DISCONTINUED | OUTPATIENT
Start: 2020-08-11 | End: 2020-08-19

## 2020-08-11 RX ORDER — LITHIUM CARBONATE 300 MG/1
300 TABLET, EXTENDED RELEASE ORAL DAILY
Refills: 0 | Status: DISCONTINUED | OUTPATIENT
Start: 2020-08-11 | End: 2020-08-19

## 2020-08-11 RX ORDER — LITHIUM CARBONATE 300 MG/1
600 TABLET, EXTENDED RELEASE ORAL AT BEDTIME
Refills: 0 | Status: DISCONTINUED | OUTPATIENT
Start: 2020-08-11 | End: 2020-08-19

## 2020-08-11 RX ADMIN — LITHIUM CARBONATE 300 MILLIGRAM(S): 300 TABLET, EXTENDED RELEASE ORAL at 09:33

## 2020-08-11 RX ADMIN — SENNA PLUS 2 TABLET(S): 8.6 TABLET ORAL at 20:26

## 2020-08-11 RX ADMIN — Medication 2 MILLIGRAM(S): at 14:54

## 2020-08-11 RX ADMIN — Medication 2 MILLIGRAM(S): at 05:57

## 2020-08-11 RX ADMIN — SIMVASTATIN 20 MILLIGRAM(S): 20 TABLET, FILM COATED ORAL at 20:26

## 2020-08-11 RX ADMIN — Medication 0.5 MILLIGRAM(S): at 09:32

## 2020-08-11 RX ADMIN — Medication 2 MILLIGRAM(S): at 19:29

## 2020-08-11 RX ADMIN — Medication 50 MILLIGRAM(S): at 20:26

## 2020-08-11 RX ADMIN — LITHIUM CARBONATE 600 MILLIGRAM(S): 300 TABLET, EXTENDED RELEASE ORAL at 20:26

## 2020-08-11 RX ADMIN — Medication 2 MILLIGRAM(S): at 15:00

## 2020-08-11 RX ADMIN — Medication 650 MILLIGRAM(S): at 21:00

## 2020-08-11 RX ADMIN — Medication 650 MILLIGRAM(S): at 20:27

## 2020-08-11 RX ADMIN — POLYETHYLENE GLYCOL 3350 17 GRAM(S): 17 POWDER, FOR SOLUTION ORAL at 09:33

## 2020-08-11 RX ADMIN — CLOZAPINE 325 MILLIGRAM(S): 150 TABLET, ORALLY DISINTEGRATING ORAL at 20:26

## 2020-08-11 RX ADMIN — Medication 650 MILLIGRAM(S): at 00:27

## 2020-08-11 RX ADMIN — Medication 0.5 MILLIGRAM(S): at 20:26

## 2020-08-12 LAB
BASOPHILS # BLD AUTO: 0.01 K/UL — SIGNIFICANT CHANGE UP (ref 0–0.2)
BASOPHILS NFR BLD AUTO: 0.1 % — SIGNIFICANT CHANGE UP (ref 0–2)
EOSINOPHIL # BLD AUTO: 0 K/UL — SIGNIFICANT CHANGE UP (ref 0–0.5)
EOSINOPHIL NFR BLD AUTO: 0 % — SIGNIFICANT CHANGE UP (ref 0–6)
HCT VFR BLD CALC: 37.1 % — SIGNIFICANT CHANGE UP (ref 34.5–45)
HGB BLD-MCNC: 11.8 G/DL — SIGNIFICANT CHANGE UP (ref 11.5–15.5)
IMM GRANULOCYTES NFR BLD AUTO: 0.3 % — SIGNIFICANT CHANGE UP (ref 0–1.5)
LYMPHOCYTES # BLD AUTO: 19 % — SIGNIFICANT CHANGE UP (ref 13–44)
LYMPHOCYTES # BLD AUTO: 2.45 K/UL — SIGNIFICANT CHANGE UP (ref 1–3.3)
MCHC RBC-ENTMCNC: 26.9 PG — LOW (ref 27–34)
MCHC RBC-ENTMCNC: 31.8 % — LOW (ref 32–36)
MCV RBC AUTO: 84.7 FL — SIGNIFICANT CHANGE UP (ref 80–100)
MONOCYTES # BLD AUTO: 0.96 K/UL — HIGH (ref 0–0.9)
MONOCYTES NFR BLD AUTO: 7.4 % — SIGNIFICANT CHANGE UP (ref 2–14)
NEUTROPHILS # BLD AUTO: 9.43 K/UL — HIGH (ref 1.8–7.4)
NEUTROPHILS NFR BLD AUTO: 73.2 % — SIGNIFICANT CHANGE UP (ref 43–77)
NRBC # FLD: 0 K/UL — SIGNIFICANT CHANGE UP (ref 0–0)
PLATELET # BLD AUTO: 412 K/UL — HIGH (ref 150–400)
PMV BLD: 9.8 FL — SIGNIFICANT CHANGE UP (ref 7–13)
RBC # BLD: 4.38 M/UL — SIGNIFICANT CHANGE UP (ref 3.8–5.2)
RBC # FLD: 15.8 % — HIGH (ref 10.3–14.5)
WBC # BLD: 12.89 K/UL — HIGH (ref 3.8–10.5)
WBC # FLD AUTO: 12.89 K/UL — HIGH (ref 3.8–10.5)

## 2020-08-12 PROCEDURE — 99232 SBSQ HOSP IP/OBS MODERATE 35: CPT

## 2020-08-12 RX ORDER — LACTULOSE 10 G/15ML
10 SOLUTION ORAL ONCE
Refills: 0 | Status: COMPLETED | OUTPATIENT
Start: 2020-08-12 | End: 2020-08-12

## 2020-08-12 RX ADMIN — Medication 1 PATCH: at 16:31

## 2020-08-12 RX ADMIN — Medication 650 MILLIGRAM(S): at 12:47

## 2020-08-12 RX ADMIN — Medication 0.5 MILLIGRAM(S): at 09:12

## 2020-08-12 RX ADMIN — LITHIUM CARBONATE 300 MILLIGRAM(S): 300 TABLET, EXTENDED RELEASE ORAL at 09:12

## 2020-08-12 RX ADMIN — POLYETHYLENE GLYCOL 3350 17 GRAM(S): 17 POWDER, FOR SOLUTION ORAL at 09:12

## 2020-08-12 RX ADMIN — Medication 2 MILLIGRAM(S): at 09:30

## 2020-08-12 RX ADMIN — LITHIUM CARBONATE 600 MILLIGRAM(S): 300 TABLET, EXTENDED RELEASE ORAL at 20:27

## 2020-08-12 RX ADMIN — Medication 2 MILLIGRAM(S): at 18:15

## 2020-08-12 RX ADMIN — Medication 650 MILLIGRAM(S): at 12:17

## 2020-08-12 RX ADMIN — Medication 2 MILLIGRAM(S): at 13:46

## 2020-08-12 RX ADMIN — Medication 50 MILLIGRAM(S): at 20:27

## 2020-08-12 RX ADMIN — SIMVASTATIN 20 MILLIGRAM(S): 20 TABLET, FILM COATED ORAL at 20:27

## 2020-08-12 RX ADMIN — CLOZAPINE 325 MILLIGRAM(S): 150 TABLET, ORALLY DISINTEGRATING ORAL at 20:27

## 2020-08-12 RX ADMIN — Medication 0.5 MILLIGRAM(S): at 20:27

## 2020-08-12 RX ADMIN — Medication 3 MILLIGRAM(S): at 22:31

## 2020-08-12 RX ADMIN — SENNA PLUS 2 TABLET(S): 8.6 TABLET ORAL at 20:27

## 2020-08-12 RX ADMIN — Medication 1 PATCH: at 11:30

## 2020-08-12 NOTE — PROGRESS NOTE ADULT - SUBJECTIVE AND OBJECTIVE BOX
CC/Reason for Consult: pain in knee    SUBJECTIVE / OVERNIGHT EVENTS:  Reports pain and swelling of right knee after fall night before last  (see note 8/11)    MEDICATIONS  (STANDING):  clonazePAM  Tablet 0.5 milliGRAM(s) Oral two times a day  cloZAPine 325 milliGRAM(s) Oral at bedtime  lactulose Syrup 10 Gram(s) Oral once  lithium 300 milliGRAM(s) Oral daily  lithium 600 milliGRAM(s) Oral at bedtime  polyethylene glycol 3350 17 Gram(s) Oral daily  senna 2 Tablet(s) Oral at bedtime  simvastatin 20 milliGRAM(s) Oral at bedtime  traZODone 50 milliGRAM(s) Oral at bedtime    MEDICATIONS  (PRN):  acetaminophen   Tablet .. 650 milliGRAM(s) Oral every 6 hours PRN Mild Pain (1 - 3), Moderate Pain (4 - 6), Severe Pain (7 - 10)  haloperidol     Tablet 5 milliGRAM(s) Oral every 4 hours PRN Agitation  haloperidol    Injectable 5 milliGRAM(s) IntraMuscular once PRN Severe agitation  LORazepam     Tablet 2 milliGRAM(s) Oral every 4 hours PRN Anxiety/agitation  LORazepam   Injectable 2 milliGRAM(s) IntraMuscular once PRN Severe agitation  melatonin. 3 milliGRAM(s) Oral at bedtime PRN Insomnia  nicotine  Polacrilex Gum 2 milliGRAM(s) Oral every 3 hours PRN nicotine cravings  nicotine -   7 mG/24Hr(s) Patch 1 patch Transdermal daily PRN nicotine replacement      Vital Signs Last 24 Hrs  T(C): 37.1 (12 Aug 2020 08:50), Max: 37.1 (12 Aug 2020 08:50)  T(F): 98.7 (12 Aug 2020 08:50), Max: 98.7 (12 Aug 2020 08:50)  HR: 117  BP: 103/58  BP(mean): --  RR: 19 (12 Aug 2020 08:50) (18 - 19)            PHYSICAL EXAM:  GENERAL: NAD, well-developed  HEAD:  Atraumatic, Normocephalic  EYES: EOMI, conjunctiva and sclera clear  NECK: Supple, No JVD  CHEST/LUNG: Clear to auscultation bilaterally; No wheeze  HEART: Regular rate and rhythm; No murmurs, rubs, or gallops  ABDOMEN: Soft, Nontender, Nondistended; Bowel sounds present  EXTREMITIES:  2+ Peripheral Pulses, No clubbing, cyanosis, 1+ edema right ankle edema  Right leg: full ROM without pain hip and knee joints  PSYCH: AAOx3  NEUROLOGY: non-focal  SKIN: minor bruises right ankle and leg      LABS:                        11.8   12.89 )-----------( 412      ( 12 Aug 2020 13:56 )             37.1               Care Discussed with Consultants/Other Providers: Dr Reeves

## 2020-08-12 NOTE — PROGRESS NOTE ADULT - ASSESSMENT
59F admitted for management of psychosis, with postsurgical edema of right ankle, s/p mechanical fall 8/9    Plan:  Right knee pain:  Full ROM.  Doubt fracture, no imaging needed.  Ice/cold pack, tylenol prn pain.     Ankle swelling: Non tender no palpable cord, low suspicion for DVT.  Likely disruption of venous return due to surgery and/or post-injury arthritis.  Ice pack or cold pack prn swelling, elevate leg, tylenol  prn pain.    HLD: Continue statin    Psychosis: Management per primary team

## 2020-08-13 PROCEDURE — 99232 SBSQ HOSP IP/OBS MODERATE 35: CPT

## 2020-08-13 RX ADMIN — SIMVASTATIN 20 MILLIGRAM(S): 20 TABLET, FILM COATED ORAL at 20:14

## 2020-08-13 RX ADMIN — LITHIUM CARBONATE 300 MILLIGRAM(S): 300 TABLET, EXTENDED RELEASE ORAL at 10:05

## 2020-08-13 RX ADMIN — Medication 650 MILLIGRAM(S): at 06:00

## 2020-08-13 RX ADMIN — Medication 50 MILLIGRAM(S): at 20:14

## 2020-08-13 RX ADMIN — Medication 650 MILLIGRAM(S): at 14:10

## 2020-08-13 RX ADMIN — POLYETHYLENE GLYCOL 3350 17 GRAM(S): 17 POWDER, FOR SOLUTION ORAL at 10:05

## 2020-08-13 RX ADMIN — Medication 2 MILLIGRAM(S): at 08:21

## 2020-08-13 RX ADMIN — SENNA PLUS 2 TABLET(S): 8.6 TABLET ORAL at 20:14

## 2020-08-13 RX ADMIN — Medication 0.5 MILLIGRAM(S): at 10:13

## 2020-08-13 RX ADMIN — CLOZAPINE 325 MILLIGRAM(S): 150 TABLET, ORALLY DISINTEGRATING ORAL at 20:15

## 2020-08-13 RX ADMIN — Medication 650 MILLIGRAM(S): at 04:11

## 2020-08-13 RX ADMIN — Medication 650 MILLIGRAM(S): at 15:04

## 2020-08-13 RX ADMIN — LITHIUM CARBONATE 600 MILLIGRAM(S): 300 TABLET, EXTENDED RELEASE ORAL at 20:14

## 2020-08-13 RX ADMIN — Medication 2 MILLIGRAM(S): at 14:14

## 2020-08-13 RX ADMIN — Medication 0.5 MILLIGRAM(S): at 20:15

## 2020-08-14 LAB
ALBUMIN SERPL ELPH-MCNC: 4.3 G/DL — SIGNIFICANT CHANGE UP (ref 3.3–5)
ALP SERPL-CCNC: 125 U/L — HIGH (ref 40–120)
ALT FLD-CCNC: 11 U/L — SIGNIFICANT CHANGE UP (ref 4–33)
ANION GAP SERPL CALC-SCNC: 11 MMO/L — SIGNIFICANT CHANGE UP (ref 7–14)
ANION GAP SERPL CALC-SCNC: 29 MMO/L — HIGH (ref 7–14)
AST SERPL-CCNC: 17 U/L — SIGNIFICANT CHANGE UP (ref 4–32)
BASOPHILS # BLD AUTO: 0.02 K/UL — SIGNIFICANT CHANGE UP (ref 0–0.2)
BASOPHILS NFR BLD AUTO: 0.2 % — SIGNIFICANT CHANGE UP (ref 0–2)
BILIRUB SERPL-MCNC: 0.3 MG/DL — SIGNIFICANT CHANGE UP (ref 0.2–1.2)
BUN SERPL-MCNC: 13 MG/DL — SIGNIFICANT CHANGE UP (ref 7–23)
BUN SERPL-MCNC: 14 MG/DL — SIGNIFICANT CHANGE UP (ref 7–23)
CALCIUM SERPL-MCNC: 9.7 MG/DL — SIGNIFICANT CHANGE UP (ref 8.4–10.5)
CALCIUM SERPL-MCNC: 9.8 MG/DL — SIGNIFICANT CHANGE UP (ref 8.4–10.5)
CHLORIDE SERPL-SCNC: 104 MMOL/L — SIGNIFICANT CHANGE UP (ref 98–107)
CHLORIDE SERPL-SCNC: 95 MMOL/L — LOW (ref 98–107)
CHOLEST SERPL-MCNC: 137 MG/DL — SIGNIFICANT CHANGE UP (ref 120–199)
CO2 SERPL-SCNC: 24 MMOL/L — SIGNIFICANT CHANGE UP (ref 22–31)
CO2 SERPL-SCNC: 26 MMOL/L — SIGNIFICANT CHANGE UP (ref 22–31)
CREAT SERPL-MCNC: 0.7 MG/DL — SIGNIFICANT CHANGE UP (ref 0.5–1.3)
CREAT SERPL-MCNC: 0.72 MG/DL — SIGNIFICANT CHANGE UP (ref 0.5–1.3)
EOSINOPHIL # BLD AUTO: 0 K/UL — SIGNIFICANT CHANGE UP (ref 0–0.5)
EOSINOPHIL NFR BLD AUTO: 0 % — SIGNIFICANT CHANGE UP (ref 0–6)
GLUCOSE SERPL-MCNC: 70 MG/DL — SIGNIFICANT CHANGE UP (ref 70–99)
GLUCOSE SERPL-MCNC: 84 MG/DL — SIGNIFICANT CHANGE UP (ref 70–99)
HCT VFR BLD CALC: 39.5 % — SIGNIFICANT CHANGE UP (ref 34.5–45)
HDLC SERPL-MCNC: 51 MG/DL — SIGNIFICANT CHANGE UP (ref 45–65)
HGB BLD-MCNC: 12.6 G/DL — SIGNIFICANT CHANGE UP (ref 11.5–15.5)
IMM GRANULOCYTES NFR BLD AUTO: 0.3 % — SIGNIFICANT CHANGE UP (ref 0–1.5)
LACTATE SERPL-SCNC: 1.7 MMOL/L — SIGNIFICANT CHANGE UP (ref 0.5–2)
LIPID PNL WITH DIRECT LDL SERPL: 75 MG/DL — SIGNIFICANT CHANGE UP
LITHIUM SERPL-MCNC: 0.68 MMOL/L — SIGNIFICANT CHANGE UP (ref 0.6–1.2)
LYMPHOCYTES # BLD AUTO: 1.8 K/UL — SIGNIFICANT CHANGE UP (ref 1–3.3)
LYMPHOCYTES # BLD AUTO: 15.3 % — SIGNIFICANT CHANGE UP (ref 13–44)
MCHC RBC-ENTMCNC: 27.5 PG — SIGNIFICANT CHANGE UP (ref 27–34)
MCHC RBC-ENTMCNC: 31.9 % — LOW (ref 32–36)
MCV RBC AUTO: 86.2 FL — SIGNIFICANT CHANGE UP (ref 80–100)
MONOCYTES # BLD AUTO: 1.05 K/UL — HIGH (ref 0–0.9)
MONOCYTES NFR BLD AUTO: 9 % — SIGNIFICANT CHANGE UP (ref 2–14)
NEUTROPHILS # BLD AUTO: 8.83 K/UL — HIGH (ref 1.8–7.4)
NEUTROPHILS NFR BLD AUTO: 75.2 % — SIGNIFICANT CHANGE UP (ref 43–77)
NRBC # FLD: 0 K/UL — SIGNIFICANT CHANGE UP (ref 0–0)
PLATELET # BLD AUTO: 441 K/UL — HIGH (ref 150–400)
PMV BLD: 10.6 FL — SIGNIFICANT CHANGE UP (ref 7–13)
POTASSIUM SERPL-MCNC: 4.3 MMOL/L — SIGNIFICANT CHANGE UP (ref 3.5–5.3)
POTASSIUM SERPL-MCNC: 4.6 MMOL/L — SIGNIFICANT CHANGE UP (ref 3.5–5.3)
POTASSIUM SERPL-SCNC: 4.3 MMOL/L — SIGNIFICANT CHANGE UP (ref 3.5–5.3)
POTASSIUM SERPL-SCNC: 4.6 MMOL/L — SIGNIFICANT CHANGE UP (ref 3.5–5.3)
PROT SERPL-MCNC: 6.7 G/DL — SIGNIFICANT CHANGE UP (ref 6–8.3)
RBC # BLD: 4.58 M/UL — SIGNIFICANT CHANGE UP (ref 3.8–5.2)
RBC # FLD: 16 % — HIGH (ref 10.3–14.5)
SODIUM SERPL-SCNC: 141 MMOL/L — SIGNIFICANT CHANGE UP (ref 135–145)
SODIUM SERPL-SCNC: 148 MMOL/L — HIGH (ref 135–145)
TRIGL SERPL-MCNC: 109 MG/DL — SIGNIFICANT CHANGE UP (ref 10–149)
WBC # BLD: 11.73 K/UL — HIGH (ref 3.8–10.5)
WBC # FLD AUTO: 11.73 K/UL — HIGH (ref 3.8–10.5)

## 2020-08-14 PROCEDURE — 99232 SBSQ HOSP IP/OBS MODERATE 35: CPT

## 2020-08-14 RX ADMIN — LITHIUM CARBONATE 300 MILLIGRAM(S): 300 TABLET, EXTENDED RELEASE ORAL at 08:16

## 2020-08-14 RX ADMIN — Medication 0.5 MILLIGRAM(S): at 08:01

## 2020-08-14 RX ADMIN — Medication 1 PATCH: at 08:16

## 2020-08-14 RX ADMIN — Medication 2 MILLIGRAM(S): at 16:32

## 2020-08-14 RX ADMIN — LITHIUM CARBONATE 600 MILLIGRAM(S): 300 TABLET, EXTENDED RELEASE ORAL at 21:06

## 2020-08-14 RX ADMIN — POLYETHYLENE GLYCOL 3350 17 GRAM(S): 17 POWDER, FOR SOLUTION ORAL at 08:16

## 2020-08-14 RX ADMIN — Medication 1 PATCH: at 08:17

## 2020-08-14 RX ADMIN — SENNA PLUS 2 TABLET(S): 8.6 TABLET ORAL at 21:06

## 2020-08-14 RX ADMIN — Medication 0.5 MILLIGRAM(S): at 21:06

## 2020-08-14 RX ADMIN — Medication 2 MILLIGRAM(S): at 13:31

## 2020-08-14 RX ADMIN — Medication 50 MILLIGRAM(S): at 21:06

## 2020-08-14 RX ADMIN — SIMVASTATIN 20 MILLIGRAM(S): 20 TABLET, FILM COATED ORAL at 21:06

## 2020-08-14 RX ADMIN — CLOZAPINE 325 MILLIGRAM(S): 150 TABLET, ORALLY DISINTEGRATING ORAL at 21:06

## 2020-08-15 RX ADMIN — Medication 1 PATCH: at 09:48

## 2020-08-15 RX ADMIN — Medication 50 MILLIGRAM(S): at 20:53

## 2020-08-15 RX ADMIN — LITHIUM CARBONATE 600 MILLIGRAM(S): 300 TABLET, EXTENDED RELEASE ORAL at 20:53

## 2020-08-15 RX ADMIN — Medication 2 MILLIGRAM(S): at 17:50

## 2020-08-15 RX ADMIN — Medication 2 MILLIGRAM(S): at 09:47

## 2020-08-15 RX ADMIN — SENNA PLUS 2 TABLET(S): 8.6 TABLET ORAL at 20:53

## 2020-08-15 RX ADMIN — Medication 2 MILLIGRAM(S): at 19:00

## 2020-08-15 RX ADMIN — LITHIUM CARBONATE 300 MILLIGRAM(S): 300 TABLET, EXTENDED RELEASE ORAL at 09:15

## 2020-08-15 RX ADMIN — Medication 0.5 MILLIGRAM(S): at 09:15

## 2020-08-15 RX ADMIN — SIMVASTATIN 20 MILLIGRAM(S): 20 TABLET, FILM COATED ORAL at 20:53

## 2020-08-15 RX ADMIN — CLOZAPINE 325 MILLIGRAM(S): 150 TABLET, ORALLY DISINTEGRATING ORAL at 20:53

## 2020-08-15 RX ADMIN — Medication 0.5 MILLIGRAM(S): at 20:53

## 2020-08-16 RX ADMIN — Medication 0.5 MILLIGRAM(S): at 09:57

## 2020-08-16 RX ADMIN — CLOZAPINE 325 MILLIGRAM(S): 150 TABLET, ORALLY DISINTEGRATING ORAL at 21:06

## 2020-08-16 RX ADMIN — Medication 1 PATCH: at 10:41

## 2020-08-16 RX ADMIN — Medication 2 MILLIGRAM(S): at 14:30

## 2020-08-16 RX ADMIN — Medication 0.5 MILLIGRAM(S): at 21:06

## 2020-08-16 RX ADMIN — Medication 1 PATCH: at 10:42

## 2020-08-16 RX ADMIN — SENNA PLUS 2 TABLET(S): 8.6 TABLET ORAL at 21:07

## 2020-08-16 RX ADMIN — SIMVASTATIN 20 MILLIGRAM(S): 20 TABLET, FILM COATED ORAL at 21:07

## 2020-08-16 RX ADMIN — Medication 50 MILLIGRAM(S): at 21:07

## 2020-08-16 RX ADMIN — Medication 2 MILLIGRAM(S): at 10:41

## 2020-08-16 RX ADMIN — POLYETHYLENE GLYCOL 3350 17 GRAM(S): 17 POWDER, FOR SOLUTION ORAL at 09:59

## 2020-08-16 RX ADMIN — Medication 2 MILLIGRAM(S): at 19:01

## 2020-08-16 RX ADMIN — LITHIUM CARBONATE 300 MILLIGRAM(S): 300 TABLET, EXTENDED RELEASE ORAL at 09:57

## 2020-08-16 RX ADMIN — LITHIUM CARBONATE 600 MILLIGRAM(S): 300 TABLET, EXTENDED RELEASE ORAL at 21:07

## 2020-08-17 PROCEDURE — 99232 SBSQ HOSP IP/OBS MODERATE 35: CPT

## 2020-08-17 RX ORDER — CLONAZEPAM 1 MG
0.5 TABLET ORAL
Refills: 0 | Status: DISCONTINUED | OUTPATIENT
Start: 2020-08-18 | End: 2020-08-19

## 2020-08-17 RX ADMIN — SENNA PLUS 2 TABLET(S): 8.6 TABLET ORAL at 20:45

## 2020-08-17 RX ADMIN — LITHIUM CARBONATE 600 MILLIGRAM(S): 300 TABLET, EXTENDED RELEASE ORAL at 20:45

## 2020-08-17 RX ADMIN — Medication 0.5 MILLIGRAM(S): at 20:45

## 2020-08-17 RX ADMIN — Medication 0.5 MILLIGRAM(S): at 08:12

## 2020-08-17 RX ADMIN — Medication 1 PATCH: at 08:13

## 2020-08-17 RX ADMIN — POLYETHYLENE GLYCOL 3350 17 GRAM(S): 17 POWDER, FOR SOLUTION ORAL at 08:13

## 2020-08-17 RX ADMIN — CLOZAPINE 325 MILLIGRAM(S): 150 TABLET, ORALLY DISINTEGRATING ORAL at 20:45

## 2020-08-17 RX ADMIN — LITHIUM CARBONATE 300 MILLIGRAM(S): 300 TABLET, EXTENDED RELEASE ORAL at 08:12

## 2020-08-17 RX ADMIN — SIMVASTATIN 20 MILLIGRAM(S): 20 TABLET, FILM COATED ORAL at 20:45

## 2020-08-17 RX ADMIN — Medication 50 MILLIGRAM(S): at 20:45

## 2020-08-18 LAB
BASOPHILS # BLD AUTO: 0.03 K/UL — SIGNIFICANT CHANGE UP (ref 0–0.2)
BASOPHILS NFR BLD AUTO: 0.2 % — SIGNIFICANT CHANGE UP (ref 0–2)
EOSINOPHIL # BLD AUTO: 0 K/UL — SIGNIFICANT CHANGE UP (ref 0–0.5)
EOSINOPHIL NFR BLD AUTO: 0 % — SIGNIFICANT CHANGE UP (ref 0–6)
HCT VFR BLD CALC: 40.3 % — SIGNIFICANT CHANGE UP (ref 34.5–45)
HGB BLD-MCNC: 12.2 G/DL — SIGNIFICANT CHANGE UP (ref 11.5–15.5)
IMM GRANULOCYTES NFR BLD AUTO: 0.2 % — SIGNIFICANT CHANGE UP (ref 0–1.5)
LYMPHOCYTES # BLD AUTO: 17.3 % — SIGNIFICANT CHANGE UP (ref 13–44)
LYMPHOCYTES # BLD AUTO: 2.37 K/UL — SIGNIFICANT CHANGE UP (ref 1–3.3)
MCHC RBC-ENTMCNC: 26.7 PG — LOW (ref 27–34)
MCHC RBC-ENTMCNC: 30.3 % — LOW (ref 32–36)
MCV RBC AUTO: 88.2 FL — SIGNIFICANT CHANGE UP (ref 80–100)
MONOCYTES # BLD AUTO: 1.03 K/UL — HIGH (ref 0–0.9)
MONOCYTES NFR BLD AUTO: 7.5 % — SIGNIFICANT CHANGE UP (ref 2–14)
NEUTROPHILS # BLD AUTO: 10.2 K/UL — HIGH (ref 1.8–7.4)
NEUTROPHILS NFR BLD AUTO: 74.8 % — SIGNIFICANT CHANGE UP (ref 43–77)
NRBC # FLD: 0 K/UL — SIGNIFICANT CHANGE UP (ref 0–0)
PLATELET # BLD AUTO: 453 K/UL — HIGH (ref 150–400)
PMV BLD: 10.5 FL — SIGNIFICANT CHANGE UP (ref 7–13)
RBC # BLD: 4.57 M/UL — SIGNIFICANT CHANGE UP (ref 3.8–5.2)
RBC # FLD: 16 % — HIGH (ref 10.3–14.5)
SARS-COV-2 RNA SPEC QL NAA+PROBE: SIGNIFICANT CHANGE UP
WBC # BLD: 13.66 K/UL — HIGH (ref 3.8–10.5)
WBC # FLD AUTO: 13.66 K/UL — HIGH (ref 3.8–10.5)

## 2020-08-18 PROCEDURE — 99232 SBSQ HOSP IP/OBS MODERATE 35: CPT

## 2020-08-18 RX ORDER — CLOZAPINE 150 MG/1
1 TABLET, ORALLY DISINTEGRATING ORAL
Qty: 30 | Refills: 0
Start: 2020-08-18 | End: 2020-09-16

## 2020-08-18 RX ORDER — NICOTINE POLACRILEX 2 MG
1 GUM BUCCAL
Qty: 14 | Refills: 0
Start: 2020-08-18 | End: 2020-08-31

## 2020-08-18 RX ORDER — POLYETHYLENE GLYCOL 3350 17 G/17G
17 POWDER, FOR SOLUTION ORAL
Qty: 0 | Refills: 0 | DISCHARGE
Start: 2020-08-18

## 2020-08-18 RX ORDER — LANOLIN ALCOHOL/MO/W.PET/CERES
1 CREAM (GRAM) TOPICAL
Qty: 0 | Refills: 0 | DISCHARGE
Start: 2020-08-18

## 2020-08-18 RX ORDER — LITHIUM CARBONATE 300 MG/1
1 TABLET, EXTENDED RELEASE ORAL
Qty: 30 | Refills: 0
Start: 2020-08-18 | End: 2020-09-16

## 2020-08-18 RX ORDER — ZIPRASIDONE HYDROCHLORIDE 20 MG/1
0 CAPSULE ORAL
Qty: 0 | Refills: 0 | DISCHARGE

## 2020-08-18 RX ORDER — TRAZODONE HCL 50 MG
1 TABLET ORAL
Qty: 30 | Refills: 0
Start: 2020-08-18 | End: 2020-09-16

## 2020-08-18 RX ORDER — SIMVASTATIN 20 MG/1
1 TABLET, FILM COATED ORAL
Qty: 0 | Refills: 0 | DISCHARGE

## 2020-08-18 RX ORDER — SIMVASTATIN 20 MG/1
1 TABLET, FILM COATED ORAL
Qty: 30 | Refills: 0
Start: 2020-08-18 | End: 2020-09-16

## 2020-08-18 RX ORDER — CLOZAPINE 150 MG/1
350 TABLET, ORALLY DISINTEGRATING ORAL
Qty: 0 | Refills: 0 | DISCHARGE

## 2020-08-18 RX ORDER — CLONAZEPAM 1 MG
1 TABLET ORAL
Qty: 28 | Refills: 0
Start: 2020-08-18 | End: 2020-08-31

## 2020-08-18 RX ORDER — CLOZAPINE 150 MG/1
3 TABLET, ORALLY DISINTEGRATING ORAL
Qty: 21 | Refills: 0
Start: 2020-08-18 | End: 2020-08-24

## 2020-08-18 RX ORDER — CLOZAPINE 150 MG/1
1 TABLET, ORALLY DISINTEGRATING ORAL
Qty: 7 | Refills: 0
Start: 2020-08-18 | End: 2020-08-24

## 2020-08-18 RX ORDER — SENNA PLUS 8.6 MG/1
2 TABLET ORAL
Qty: 0 | Refills: 0 | DISCHARGE
Start: 2020-08-18

## 2020-08-18 RX ADMIN — Medication 2 MILLIGRAM(S): at 09:00

## 2020-08-18 RX ADMIN — LITHIUM CARBONATE 600 MILLIGRAM(S): 300 TABLET, EXTENDED RELEASE ORAL at 20:40

## 2020-08-18 RX ADMIN — CLOZAPINE 325 MILLIGRAM(S): 150 TABLET, ORALLY DISINTEGRATING ORAL at 20:40

## 2020-08-18 RX ADMIN — SIMVASTATIN 20 MILLIGRAM(S): 20 TABLET, FILM COATED ORAL at 21:23

## 2020-08-18 RX ADMIN — Medication 2 MILLIGRAM(S): at 13:44

## 2020-08-18 RX ADMIN — Medication 0.5 MILLIGRAM(S): at 08:42

## 2020-08-18 RX ADMIN — POLYETHYLENE GLYCOL 3350 17 GRAM(S): 17 POWDER, FOR SOLUTION ORAL at 08:42

## 2020-08-18 RX ADMIN — Medication 0.5 MILLIGRAM(S): at 20:40

## 2020-08-18 RX ADMIN — Medication 1 PATCH: at 09:04

## 2020-08-18 RX ADMIN — SENNA PLUS 2 TABLET(S): 8.6 TABLET ORAL at 21:23

## 2020-08-18 RX ADMIN — LITHIUM CARBONATE 300 MILLIGRAM(S): 300 TABLET, EXTENDED RELEASE ORAL at 08:42

## 2020-08-18 RX ADMIN — Medication 50 MILLIGRAM(S): at 21:24

## 2020-08-19 VITALS — RESPIRATION RATE: 19 BRPM

## 2020-08-19 PROCEDURE — 99238 HOSP IP/OBS DSCHRG MGMT 30/<: CPT

## 2020-08-19 RX ADMIN — LITHIUM CARBONATE 300 MILLIGRAM(S): 300 TABLET, EXTENDED RELEASE ORAL at 09:10

## 2020-08-19 RX ADMIN — Medication 0.5 MILLIGRAM(S): at 09:10

## 2020-08-23 ENCOUNTER — EMERGENCY (EMERGENCY)
Facility: HOSPITAL | Age: 60
LOS: 0 days | Discharge: ANOTHER HOSPITAL | End: 2020-08-24
Attending: STUDENT IN AN ORGANIZED HEALTH CARE EDUCATION/TRAINING PROGRAM
Payer: MEDICARE

## 2020-08-23 VITALS
WEIGHT: 184.97 LBS | OXYGEN SATURATION: 98 % | SYSTOLIC BLOOD PRESSURE: 127 MMHG | DIASTOLIC BLOOD PRESSURE: 86 MMHG | RESPIRATION RATE: 16 BRPM | TEMPERATURE: 99 F | HEART RATE: 96 BPM

## 2020-08-23 DIAGNOSIS — Z76.0 ENCOUNTER FOR ISSUE OF REPEAT PRESCRIPTION: ICD-10-CM

## 2020-08-23 DIAGNOSIS — F25.9 SCHIZOAFFECTIVE DISORDER, UNSPECIFIED: ICD-10-CM

## 2020-08-23 DIAGNOSIS — F25.0 SCHIZOAFFECTIVE DISORDER, BIPOLAR TYPE: ICD-10-CM

## 2020-08-23 LAB
AMPHET UR-MCNC: NEGATIVE — SIGNIFICANT CHANGE UP
ANION GAP SERPL CALC-SCNC: 7 MMOL/L — SIGNIFICANT CHANGE UP (ref 5–17)
APAP SERPL-MCNC: 5 UG/ML — LOW (ref 10–30)
APPEARANCE UR: CLEAR — SIGNIFICANT CHANGE UP
BACTERIA # UR AUTO: ABNORMAL
BARBITURATES UR SCN-MCNC: NEGATIVE — SIGNIFICANT CHANGE UP
BASOPHILS # BLD AUTO: 0.01 K/UL — SIGNIFICANT CHANGE UP (ref 0–0.2)
BASOPHILS NFR BLD AUTO: 0.1 % — SIGNIFICANT CHANGE UP (ref 0–2)
BENZODIAZ UR-MCNC: NEGATIVE — SIGNIFICANT CHANGE UP
BILIRUB UR-MCNC: NEGATIVE — SIGNIFICANT CHANGE UP
BUN SERPL-MCNC: 11 MG/DL — SIGNIFICANT CHANGE UP (ref 7–23)
CALCIUM SERPL-MCNC: 8.6 MG/DL — SIGNIFICANT CHANGE UP (ref 8.5–10.1)
CHLORIDE SERPL-SCNC: 110 MMOL/L — HIGH (ref 96–108)
CO2 SERPL-SCNC: 25 MMOL/L — SIGNIFICANT CHANGE UP (ref 22–31)
COCAINE METAB.OTHER UR-MCNC: NEGATIVE — SIGNIFICANT CHANGE UP
COLOR SPEC: YELLOW — SIGNIFICANT CHANGE UP
CREAT SERPL-MCNC: 0.77 MG/DL — SIGNIFICANT CHANGE UP (ref 0.5–1.3)
DIFF PNL FLD: NEGATIVE — SIGNIFICANT CHANGE UP
EOSINOPHIL # BLD AUTO: 0 K/UL — SIGNIFICANT CHANGE UP (ref 0–0.5)
EOSINOPHIL NFR BLD AUTO: 0 % — SIGNIFICANT CHANGE UP (ref 0–6)
EPI CELLS # UR: ABNORMAL
ETHANOL SERPL-MCNC: <10 MG/DL — SIGNIFICANT CHANGE UP (ref 0–10)
GLUCOSE SERPL-MCNC: 105 MG/DL — HIGH (ref 70–99)
GLUCOSE UR QL: NEGATIVE MG/DL — SIGNIFICANT CHANGE UP
GRAN CASTS # UR COMP ASSIST: ABNORMAL /LPF
HCT VFR BLD CALC: 34.7 % — SIGNIFICANT CHANGE UP (ref 34.5–45)
HGB BLD-MCNC: 11.6 G/DL — SIGNIFICANT CHANGE UP (ref 11.5–15.5)
IMM GRANULOCYTES NFR BLD AUTO: 0.2 % — SIGNIFICANT CHANGE UP (ref 0–1.5)
KETONES UR-MCNC: NEGATIVE — SIGNIFICANT CHANGE UP
LEUKOCYTE ESTERASE UR-ACNC: NEGATIVE — SIGNIFICANT CHANGE UP
LITHIUM SERPL-MCNC: 0.27 MMOL/L — LOW (ref 0.6–1.2)
LYMPHOCYTES # BLD AUTO: 18.6 % — SIGNIFICANT CHANGE UP (ref 13–44)
LYMPHOCYTES # BLD AUTO: 2.47 K/UL — SIGNIFICANT CHANGE UP (ref 1–3.3)
MCHC RBC-ENTMCNC: 27.9 PG — SIGNIFICANT CHANGE UP (ref 27–34)
MCHC RBC-ENTMCNC: 33.4 GM/DL — SIGNIFICANT CHANGE UP (ref 32–36)
MCV RBC AUTO: 83.4 FL — SIGNIFICANT CHANGE UP (ref 80–100)
METHADONE UR-MCNC: NEGATIVE — SIGNIFICANT CHANGE UP
MONOCYTES # BLD AUTO: 1.07 K/UL — HIGH (ref 0–0.9)
MONOCYTES NFR BLD AUTO: 8.1 % — SIGNIFICANT CHANGE UP (ref 2–14)
NEUTROPHILS # BLD AUTO: 9.7 K/UL — HIGH (ref 1.8–7.4)
NEUTROPHILS NFR BLD AUTO: 73 % — SIGNIFICANT CHANGE UP (ref 43–77)
NITRITE UR-MCNC: NEGATIVE — SIGNIFICANT CHANGE UP
NRBC # BLD: 0 /100 WBCS — SIGNIFICANT CHANGE UP (ref 0–0)
OPIATES UR-MCNC: NEGATIVE — SIGNIFICANT CHANGE UP
PCP SPEC-MCNC: SIGNIFICANT CHANGE UP
PCP UR-MCNC: NEGATIVE — SIGNIFICANT CHANGE UP
PH UR: 6.5 — SIGNIFICANT CHANGE UP (ref 5–8)
PLATELET # BLD AUTO: 464 K/UL — HIGH (ref 150–400)
POTASSIUM SERPL-MCNC: 3.7 MMOL/L — SIGNIFICANT CHANGE UP (ref 3.5–5.3)
POTASSIUM SERPL-SCNC: 3.7 MMOL/L — SIGNIFICANT CHANGE UP (ref 3.5–5.3)
PROT UR-MCNC: 15 MG/DL
RBC # BLD: 4.16 M/UL — SIGNIFICANT CHANGE UP (ref 3.8–5.2)
RBC # FLD: 16.7 % — HIGH (ref 10.3–14.5)
RBC CASTS # UR COMP ASSIST: SIGNIFICANT CHANGE UP /HPF (ref 0–4)
SALICYLATES SERPL-MCNC: <1.7 MG/DL — LOW (ref 2.8–20)
SARS-COV-2 IGG SERPL QL IA: NEGATIVE — SIGNIFICANT CHANGE UP
SARS-COV-2 IGM SERPL IA-ACNC: 0.09 INDEX — SIGNIFICANT CHANGE UP
SARS-COV-2 RNA SPEC QL NAA+PROBE: SIGNIFICANT CHANGE UP
SODIUM SERPL-SCNC: 142 MMOL/L — SIGNIFICANT CHANGE UP (ref 135–145)
SP GR SPEC: 1.01 — SIGNIFICANT CHANGE UP (ref 1.01–1.02)
THC UR QL: NEGATIVE — SIGNIFICANT CHANGE UP
UROBILINOGEN FLD QL: NEGATIVE MG/DL — SIGNIFICANT CHANGE UP
WBC # BLD: 13.28 K/UL — HIGH (ref 3.8–10.5)
WBC # FLD AUTO: 13.28 K/UL — HIGH (ref 3.8–10.5)
WBC UR QL: SIGNIFICANT CHANGE UP

## 2020-08-23 PROCEDURE — 90792 PSYCH DIAG EVAL W/MED SRVCS: CPT | Mod: 95

## 2020-08-23 PROCEDURE — 93010 ELECTROCARDIOGRAM REPORT: CPT

## 2020-08-23 PROCEDURE — 99285 EMERGENCY DEPT VISIT HI MDM: CPT | Mod: CS

## 2020-08-23 RX ORDER — LITHIUM CARBONATE 300 MG/1
300 TABLET, EXTENDED RELEASE ORAL ONCE
Refills: 0 | Status: COMPLETED | OUTPATIENT
Start: 2020-08-23 | End: 2020-08-23

## 2020-08-23 RX ORDER — CLONAZEPAM 1 MG
0.5 TABLET ORAL ONCE
Refills: 0 | Status: DISCONTINUED | OUTPATIENT
Start: 2020-08-23 | End: 2020-08-23

## 2020-08-23 RX ORDER — CLOZAPINE 150 MG/1
325 TABLET, ORALLY DISINTEGRATING ORAL AT BEDTIME
Refills: 0 | Status: DISCONTINUED | OUTPATIENT
Start: 2020-08-23 | End: 2020-08-24

## 2020-08-23 RX ORDER — CLONAZEPAM 1 MG
0.5 TABLET ORAL
Refills: 0 | Status: DISCONTINUED | OUTPATIENT
Start: 2020-08-23 | End: 2020-08-24

## 2020-08-23 RX ORDER — LITHIUM CARBONATE 300 MG/1
300 TABLET, EXTENDED RELEASE ORAL EVERY 12 HOURS
Refills: 0 | Status: DISCONTINUED | OUTPATIENT
Start: 2020-08-23 | End: 2020-08-24

## 2020-08-23 RX ADMIN — Medication 0.5 MILLIGRAM(S): at 18:31

## 2020-08-23 RX ADMIN — LITHIUM CARBONATE 300 MILLIGRAM(S): 300 TABLET, EXTENDED RELEASE ORAL at 15:44

## 2020-08-23 RX ADMIN — Medication 0.5 MILLIGRAM(S): at 11:59

## 2020-08-23 RX ADMIN — LITHIUM CARBONATE 300 MILLIGRAM(S): 300 TABLET, EXTENDED RELEASE ORAL at 20:14

## 2020-08-23 RX ADMIN — CLOZAPINE 325 MILLIGRAM(S): 150 TABLET, ORALLY DISINTEGRATING ORAL at 19:05

## 2020-08-23 NOTE — ED BEHAVIORAL HEALTH ASSESSMENT NOTE - DETAILS
[facility] denied Si/intet or plan ankle pain/swelling MD made aware spoke with dr benavides - d/c summary reviewed; had been manic on unit/presentation - no beh issues; d/c'd on klonopin 0.5mg bid, lithium 300mg qam, 600mg hs, clozaril 325mg HS, trazodone 50mg HS

## 2020-08-23 NOTE — ED ADULT NURSE NOTE - HYGIENE
Good
Continue Regimen: Spironolactone 50 mg PO daily (decreasing from 100 mg daily due to reported dizziness)\\nTretinoin 0.05 % topical cream Apply a pea size amount to entire face QHS (pt is using as a spot treatment)\\n
Detail Level: Zone
Plan: Pt. inquired specifically about chemical peels; provided with skin care center info

## 2020-08-23 NOTE — ED ADULT TRIAGE NOTE - CHIEF COMPLAINT QUOTE
as per pt and EMS pt was discharged from [facility] this am without her daily meds, and she doesn't have access to them on a Sunday. Lithium, and Klonopin. Pt cooperative denies wanting to hurt self or anyone else just wants meds. Pt 1:1 in place till Dr menjivar appears manic
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
## 2020-08-23 NOTE — ED BEHAVIORAL HEALTH ASSESSMENT NOTE - SUMMARY
58yo domiciled in residential facility, unemployed,  F with past psych hx of schizoaffective d/o with bipolar subtype, with multiple psych admissions inc recent in [place] [id number] (d/c'd on 8/18/20) presented with erratic, manic behavior. Patient is noted to be affectively labile, with disinhibition - sexually preoccupied, poor social boundaries with tangential thought process. Collateral from her family and her residence detail manic decompensation detailing sexually inappropriate behavior, irritability and engaging in potential dangerous social situation. Patient would benefit from inpatient psych hospitalization as she's unable to care for herself and concern about her safety due to ongoing psychiatric symptoms. Collateral from residence and pt imply that she's taking her psychiatric medications but it might be that she's not consistently, fully compliant. For now will resume home psych meds.

## 2020-08-23 NOTE — ED ADULT NURSE NOTE - ED STAT RN HANDOFF DETAILS
pt awake, alert, 1:1 for safety, report given to JOSE michel pt awake, alert, 1:1 for safety, report given to JOSE Wu

## 2020-08-23 NOTE — ED ADULT NURSE NOTE - CHIEF COMPLAINT QUOTE
as per pt and EMS pt was discharged from [facility] this am without her daily meds, and she doesn't have access to them on a Sunday. Lithium, and Klonopin. Pt cooperative denies wanting to hurt self or anyone else just wants meds. Pt 1:1 in place till Dr menjivar appears manic

## 2020-08-23 NOTE — ED PROVIDER NOTE - CLINICAL SUMMARY MEDICAL DECISION MAKING FREE TEXT BOX
chart review from 17 days ago pt is on Geodon, and clozaril, pt states she take clonopin and lithium. Pt feels she is not doing well psychiatrically, workup for organic cause of psychosis is unremarkable, will discuss stability and psychiatric management w psychiatry. Clonopin given to manage anxiety

## 2020-08-23 NOTE — ED PROVIDER NOTE - PATIENT PORTAL LINK FT
You can access the FollowMyHealth Patient Portal offered by [facility] by registering at the following website: [email or web address]. By joining [employer]’s FollowMyHealth portal, you will also be able to view your health information using other applications (apps) compatible with our system.
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
## 2020-08-23 NOTE — ED PROVIDER NOTE - PHYSICAL EXAMINATION
Gen: Alert, NAD  Head: NC, AT   Eyes: PERRL, EOMI, normal lids/conjunctiva  ENT: normal hearing, patent oropharynx without erythema/exudate, uvula midline  Neck: supple, no tenderness, Trachea midline  Pulm: Bilateral BS, normal resp effort, no wheeze/stridor/retractions  CV: RRR, no M/R/G, 2+ radial and dp pulses bl, no edema  Abd: soft, NT/ND, +BS, no hepatosplenomegaly  Mskel: extremities x4 with normal ROM and no joint effusions. no ctl spine ttp.   Skin: no rash, no bruising   Neuro: AAOx3, no sensory/motor deficits, CN 2-12 intact  Psych: Pt w/goal directed thought occasional intrusive behavior

## 2020-08-23 NOTE — ED BEHAVIORAL HEALTH ASSESSMENT NOTE - DESCRIPTION
no beh issues; compliant with her medications    COVID Exposure Screen- Patient    1.	*In the past 14 days, have you been around anyone with a positive COVID-19 test?*   (  ) Yes   ( x ) No   (  ) Unknown- Reason (e.g. patient uncertain, sedated, refusing to answer, etc.):  ______    7.	*Have you been out of [place] within the past 14 days?*  (  ) Yes   (x  ) No   (  ) Unknown- Reason (e.g. patient uncertain, sedated, refusing to answer, etc.): _______    COVID Exposure Screen- collateral (i.e. third-party, chart review, belongings, etc; include EMS and ED staff)    1.	*In the past 14 days, has the patient been around anyone with a positive COVID-19 test?*   (  ) Yes   ( x ) No   (  ) Unknown- Reason (e.g. collateral uncertain, refusing to answer, etc.):  ______    7.	*Has the patient been out of [place] within the past 14 days?*  (  ) Yes   ( x ) No   (  ) Unknown- Reason (e.g. collateral uncertain, refusing to answer, etc.): _______ HL, hx of L broken ankle , domiciled, unemployed

## 2020-08-23 NOTE — ED ADULT NURSE NOTE - ED STAT RN HANDOFF DETAILS 2
Received report from Remed[facility]/JOSE and assumed care of pt. PT A/O x3, NAD noted, VSS, 1: 1 in place, awaiting transfer, will continue to monitor.

## 2020-08-23 NOTE — ED BEHAVIORAL HEALTH NOTE - BEHAVIORAL HEALTH NOTE
===================  PRE-HOSPITAL COURSE  ===================  SOURCE:  RN-Mehreen   DETAILS:  Pt BIBEMS      ============  ED COURSE  ============  SOURCE:  RN Mehreen  ARRIVAL:  Pt BIBEMS  BELONGINGS:  Clothing, Pocketbook   BEHAVIOR: Per RN, Pt was BIBEMS from supportive housing residence in [place], complaining of not having all of her psychiatric medications. Pt reportedly was at [facility] overnight and was discharged this morning. Pt told RN that she tried to get in touch with her outpatient provider but had no success. While in the ED, pt cooperated with triage protocols. She willingly provided urine sample and blood draws. Pt presented with some mild irritability and would talk loud at times, calling her roommate the N* word. Pt responded well to re-direction. Pt given Klonapin 0.5 mg PO to calm her down. Pt denied experiencing any SI/HI/AH/VH. Pt had lunch and has been resting comfortably on stretcher.    TREATMENT:  Klonapin 0.5 mg PO   VISITORS:  None      ========================  FOR EACH COLLATERAL  ========================  NAME: Simone Hsu  NUMBER: [phone]  RELATIONSHIP: Brother   RELIABILITY: Fair   COMMENTS: Brother states that pt has been calling him several time daily since discharge from [facility] on 8/19/20.      ========================  PATIENT DEMOGRAPHICS: Pt is a 60 y/o, single, disabled,  female with long history of Schizoaffective Disorder who resides in supportive living residence in [place].   ========================  HPI  BASELINE FUNCTIONING: Per brother, pt is easy going and pleasant to be around when she is compliant with her medication regimen. He states that since September 2019, pt has been steadily decompensating with erratic behavior and multiple hospital visits.    DATE HPI STARTED: August 2020  DECOMPENSATION: Per brother, pt was just discharged from [facility] on 8/19/20 and he did not think pt was ready for discharge. He states that he has been getting multiple calls from her daily, talking about wanting to buy a new car which she can’t afford, wanting to go see a friend in [place] and planning to stay at a hotel. Furthermore, brother states that he has been getting calls from pt’s peers at the residence that pt has been promiscuous and going out to bars to solicit sex. 2 days ago, pt called her 23 y/o son who she has not spoken to in over a year and the conversation did not go well. Brother states that pt is under the belief that her son hates her because brother and his wife are telling him bad things about pt which is not accurate. Brother states that yesterday, pt called him twice and hung up on him, saying “I wish you were dead”. Brother states that he has been trying to steer pt in the right direction by encouraging medication compliance however this sets her off and she starts yelling at him. Brother denies pt being psychotic and states that pt has been more manic. He is unable to comment on pt’s sleep, energy or appetite as he has not seen her since COVID pandemic.   SUICIDALITY: Brother denies   VIOLENCE: Brother denies   SUBSTANCE: Brother states that he has been informed by pt’s peers at the residence that she has been going out to bars the last couple of days. It is unclear how much she is drinking or if she is using any other illicit drugs.          ========================  PAST PSYCHIATRIC HISTORY  ========================  DATE PAST PSYCHIATRIC HISTORY STARTED: 30 years ago   MAIN PSYCHIATRIC DIAGNOSIS: Schizoaffective Disorder, Bipolar Type   PSYCHIATRIC HOSPITALIZATIONS:  Most recent hospitalization at [facility] from 8/6/20-8/19/20, [facility] from 9/19/19-10/24/19  PRIOR ILLNESS: Long history of Schizoaffective Disorder, compliance with outpatient treatment is on and off.   SUICIDALITY:  No history   VIOLENCE:  No history   SUBSTANCE USE: No history         ==============  OTHER HISTORY  ==============  CURRENT MEDICATION:  Clozaril 25 mg PO q PM, Clozaril 100 mg PO TID, Zocor 20 mg PO q PM, Melatonin 3 mg PO q PM PRN, Senna 2 tablets PO q PM, Clonazepam 0.5 mg po BID, Lithium 600 mg PO q PM, Lithium 300 mg PO q AM, Zocor 20 mg PO q PM, Trazadone 50 mg PO q PM   MEDICAL HISTORY:  Hyperlipidemia   ALLERGIES: Penicillin   LEGAL ISSUES: None   FIREARM ACCESS: None   SOCIAL HISTORY: Pt is single, lives in supportive housing residence. Pt has a son who is 23 y/o but doesn’t have a relationship with him. Pt survives off of SSD income    FAMILY HISTORY: Pt’s older brother committed suicide at age 35 via drowning in the 1990’s.    DEVELOPMENTAL HISTORY: No developmental delays       COVID Exposure Screen- collateral (i.e. third-party, chart review, belongings, etc; include EMS and ED staff)     1.        *In the past 14 days, has the patient been around anyone with a positive COVID-19 test?*  (  ) Yes   ( X ) No   (  ) Unknown- Reason (e.g. collateral uncertain, refusing to answer, etc.):  ______  IF YES PROCEED TO QUESTION #2. IF NO or UNKNOWN, PLEASE SKIP TO QUESTION #7  2.        Was the patient within 6 feet of them for at least 15 minutes? (  ) Yes   (  ) No   (  ) Unknown- Reason: ______   3.        Did the patient provide care for them? (  ) Yes   (  ) No   (  ) Unknown- Reason: ______   4.        Did the patient have direct physical contact with them (touched, hugged, or kissed them)? (  ) Yes   (  ) No    (  ) Unknown- Reason: ______   5.        Did the patient share eating or drinking utensils with them? (  ) Yes   (  ) No    (  ) Unknown- Reason: ______   6.        Have they sneezed, coughed, or somehow got respiratory droplets on the patient? (  ) Yes   (  ) No    (  ) Unknown- Reason: ______   7.        *Has the patient been out of [place] within the past 14 days?*  (  ) Yes   ( X ) No   (  ) Unknown- Reason (e.g. collateral uncertain, refusing to answer, etc.): _______      IF YES PLEASE ANSWER THE FOLLOWING QUESTIONS:  8.        Which state/country has the patient been to? ______   9.        Was the patient there over 24 hours? (  ) Yes   (  ) No    (  ) Unknown- Reason: ______   10.     What date did the patient return to [place]? ______

## 2020-08-23 NOTE — ED BEHAVIORAL HEALTH ASSESSMENT NOTE - PSYCHIATRIC ISSUES AND PLAN (INCLUDE STANDING AND PRN MEDICATION)
f/u with li level; resume klonopin 0.5mg po bid, Li 300mg po qam, 300mg po HS, clozaril 325mg po HS ; hold trazodone; prn: haldol 5mg po/IM q6hr prn agitation; ativan 2mg po/IM q6hr prn agitation

## 2020-08-23 NOTE — ED BEHAVIORAL HEALTH ASSESSMENT NOTE - CURRENT MEDICATION
Lithium 300mg qam, 600mg HS, clozaril 325mg HS, trazodone 50mg HS, klonopin 0.5mg bid (confirmed by d/c summary, inpt psych dr benavides and from [facility] ref #[id number])

## 2020-08-23 NOTE — ED PROVIDER NOTE - PROGRESS NOTE DETAILS
no events during shift thus far; pt. calm and cooperative throughout.  Pending psych placement, accepted for psych admission endorsed by [facility] pending bed for involuntary psych admission. no current beds in system, SW working on getting bed at [facility]. Progress: Pt without any signs of infection, fever, symptoms. UA is neg for UTI.  WCC 13 is likely reactive. Pt with chronic rt ankle pain (s/p fx and surgery last year). Pt denies any acute pain or swelling. Pt has been ambulating easily. No erythema, swelling to rt ankle and pt has from of ankle with good pulses and without any signs of infection. Pt is medically cleared for psychiatric admission.

## 2020-08-24 VITALS
SYSTOLIC BLOOD PRESSURE: 133 MMHG | HEART RATE: 84 BPM | TEMPERATURE: 98 F | DIASTOLIC BLOOD PRESSURE: 84 MMHG | OXYGEN SATURATION: 98 % | RESPIRATION RATE: 18 BRPM

## 2020-08-24 RX ORDER — ACETAMINOPHEN 500 MG
650 TABLET ORAL ONCE
Refills: 0 | Status: COMPLETED | OUTPATIENT
Start: 2020-08-24 | End: 2020-08-24

## 2020-08-24 RX ADMIN — Medication 0.5 MILLIGRAM(S): at 06:06

## 2020-08-24 RX ADMIN — Medication 0.5 MILLIGRAM(S): at 18:04

## 2020-08-24 RX ADMIN — Medication 650 MILLIGRAM(S): at 19:20

## 2020-08-24 RX ADMIN — Medication 650 MILLIGRAM(S): at 09:25

## 2020-08-24 RX ADMIN — Medication 650 MILLIGRAM(S): at 18:04

## 2020-08-24 RX ADMIN — Medication 650 MILLIGRAM(S): at 08:24

## 2020-08-24 RX ADMIN — LITHIUM CARBONATE 300 MILLIGRAM(S): 300 TABLET, EXTENDED RELEASE ORAL at 18:07

## 2020-08-24 RX ADMIN — LITHIUM CARBONATE 300 MILLIGRAM(S): 300 TABLET, EXTENDED RELEASE ORAL at 06:06

## 2020-08-24 NOTE — ED BEHAVIORAL HEALTH NOTE - BEHAVIORAL HEALTH NOTE
60yo domiciled in [facility] enriched housing, unemployed,   F with hx of schizoaffective d/o, bipolar subtype presented with erratic behavior, manic symptoms. Patient was clinically determined to require involuntary psych admission.      Patient was seen and re-evaluated. Patient said she felt "heavenly." Yet spoke of feeling "discontented because keep interfering" in her search for love. She spoke of "needing love, wanting love" and asking to be placed in a co-ed unit as she "was in an all women unit and I just need some men in my life." Patient spoke of disparate topics from calling her brother a "hernandez who's a deadbeat", of "seeing Veronika Page at the [facility]" and "being famous now because [employer] there at the hospital." Patient denied AH/VH/paranoia. She denied SI/HI.    No PRNs given overnight; no beh issues as per nursing staff        VS: T98.7 HR 83 /74 RR 20    Med: Li 300mg BID, clozaril 325mg HS, klonopin 0.5mg bid    Labs: Li level 8/23: 0.27, Covid neg      MSE: appears stated age, fair eye contact, oddly related. No psychomotor agitation or retardation. Speech: pressured, nl vol. Mood: "heavenly" Affect: labile TP: loose association TC: sexually preoccupied. No SI/HI. AAOx3, intact attention/conc. Insight and judgment: limited/limited Impulse control: currently fair      Diagnosis: Schizoaffective, bipolar type    A+P  Pt w/ hx of schizoaffective d/o bipolar type who presented with erratic beh, manic decompensation in the community as per collateral from brother and her residential facility counselor. Patient is noted to have ongoing affect lability, loosely associated thought process, sexual preoccupied thought content with pressured, hard to interrupt speech. Patient's labs review a low lithium level which might account for her decompensation but timing of the last dose of lithium she received prior to random level check on 8/23 is unknown. Patient will continue to benefit from inpatient psych hospitalization as she's unable to care for herself and concern about her safety persist in context of her ongoing psychiatric symptoms. Patient is awaiting for bed availability.    1) Continue Li 300mg po bid, clozaril 325mg po qhs, klonopin 0.5mg bid  2) Continue to hold trazodone  3) PRN: Haldol 5mg po/IM q6hr prn agitation -hold if qtc>500; ativan 2mg po/IM q6hr prn agitation  4) Ongoing 1:1 while in med ED for elopement precaution   5) Psych dispo: 2PC admission; awaiting bed availability

## 2020-08-24 NOTE — ED ADULT NURSE REASSESSMENT NOTE - NS ED NURSE REASSESS COMMENT FT1
Dr Riggs and Mehreen aware of pt placement
Patient a&Ox3. Patient states shes in here for medication. Patient came from [facility] and was given IM haldol there. VS stable.
pt is pending for placement to [facility].
received report from nurse Humphries. Pt alert and oriented. pt identified, self introduced. complained of pain on her rt ankle, MD informed. pt for transfer waiting for bed.

## 2020-08-24 NOTE — ED ADULT NURSE REASSESSMENT NOTE - NSIMPLEMENTINTERV_GEN_ALL_ED
Implemented All Fall Risk Interventions:  [place] to call system. Call bell, personal items and telephone within reach. Instruct patient to call for assistance. Room bathroom lighting operational. Non-slip footwear when patient is off stretcher. Physically safe environment: no spills, clutter or unnecessary equipment. Stretcher in lowest position, wheels locked, appropriate side rails in place. Provide visual cue, wrist band, yellow gown, etc. Monitor gait and stability. Monitor for mental status changes and reorient to person, place, and time. Review medications for side effects contributing to fall risk. Reinforce activity limits and safety measures with patient and family.

## 2022-06-30 NOTE — ED BEHAVIORAL HEALTH ASSESSMENT NOTE - IMPULSE CONTROL
Recent Visits  Date Type Provider Dept   11/01/21 Appointment MD [facility]   04/15/21 Office Visit [facility]   01/11/21 Office Visit Priya Corona MD [facility]   Showing recent visits within past 540 days with a meds authorizing provider and meeting all other requirements  Future Appointments  No visits were found meeting these conditions.   Showing future appointments within next 150 days with a meds authorizing provider and meeting all other requirements     11/18/2021 asthma exacerbation Impaired

## 2023-12-10 NOTE — ED BEHAVIORAL HEALTH ASSESSMENT NOTE - NS ED [facility] ED COURSE FOUR POINT RESTRAINTS IN ED YN
Findings discussed with patient in detail. Pt will closely monitor symptoms for any change and understands the importance of prompt outpatient follow up and will return if worse.     
No